# Patient Record
Sex: FEMALE | Race: OTHER | NOT HISPANIC OR LATINO | Employment: OTHER | URBAN - METROPOLITAN AREA
[De-identification: names, ages, dates, MRNs, and addresses within clinical notes are randomized per-mention and may not be internally consistent; named-entity substitution may affect disease eponyms.]

---

## 2024-09-05 ENCOUNTER — HOSPITAL ENCOUNTER (EMERGENCY)
Facility: HOSPITAL | Age: 79
Discharge: HOME | End: 2024-09-06
Attending: EMERGENCY MEDICINE
Payer: COMMERCIAL

## 2024-09-05 ENCOUNTER — APPOINTMENT (OUTPATIENT)
Dept: RADIOLOGY | Facility: HOSPITAL | Age: 79
End: 2024-09-05
Payer: COMMERCIAL

## 2024-09-05 DIAGNOSIS — N17.9 AKI (ACUTE KIDNEY INJURY) (CMS-HCC): ICD-10-CM

## 2024-09-05 DIAGNOSIS — N30.00 ACUTE CYSTITIS WITHOUT HEMATURIA: Primary | ICD-10-CM

## 2024-09-05 LAB
ALBUMIN SERPL BCP-MCNC: 3.5 G/DL (ref 3.4–5)
ALP SERPL-CCNC: 67 U/L (ref 33–136)
ALT SERPL W P-5'-P-CCNC: 16 U/L (ref 7–45)
ANION GAP SERPL CALC-SCNC: 12 MMOL/L (ref 10–20)
APPEARANCE UR: CLEAR
AST SERPL W P-5'-P-CCNC: 16 U/L (ref 9–39)
BASOPHILS # BLD AUTO: 0.08 X10*3/UL (ref 0–0.1)
BASOPHILS NFR BLD AUTO: 1 %
BILIRUB SERPL-MCNC: 0.3 MG/DL (ref 0–1.2)
BILIRUB UR STRIP.AUTO-MCNC: NEGATIVE MG/DL
BUN SERPL-MCNC: 20 MG/DL (ref 6–23)
CALCIUM SERPL-MCNC: 8.6 MG/DL (ref 8.6–10.3)
CHLORIDE SERPL-SCNC: 101 MMOL/L (ref 98–107)
CO2 SERPL-SCNC: 26 MMOL/L (ref 21–32)
COLOR UR: COLORLESS
CREAT SERPL-MCNC: 1.6 MG/DL (ref 0.5–1.05)
EGFRCR SERPLBLD CKD-EPI 2021: 33 ML/MIN/1.73M*2
EOSINOPHIL # BLD AUTO: 0.28 X10*3/UL (ref 0–0.4)
EOSINOPHIL NFR BLD AUTO: 3.4 %
ERYTHROCYTE [DISTWIDTH] IN BLOOD BY AUTOMATED COUNT: 13.7 % (ref 11.5–14.5)
GLUCOSE SERPL-MCNC: 107 MG/DL (ref 74–99)
GLUCOSE UR STRIP.AUTO-MCNC: NORMAL MG/DL
HCT VFR BLD AUTO: 31.2 % (ref 36–46)
HGB BLD-MCNC: 10.1 G/DL (ref 12–16)
IMM GRANULOCYTES # BLD AUTO: 0.04 X10*3/UL (ref 0–0.5)
IMM GRANULOCYTES NFR BLD AUTO: 0.5 % (ref 0–0.9)
KETONES UR STRIP.AUTO-MCNC: NEGATIVE MG/DL
LACTATE SERPL-SCNC: 0.6 MMOL/L (ref 0.4–2)
LEUKOCYTE ESTERASE UR QL STRIP.AUTO: ABNORMAL
LYMPHOCYTES # BLD AUTO: 1.42 X10*3/UL (ref 0.8–3)
LYMPHOCYTES NFR BLD AUTO: 17.2 %
MCH RBC QN AUTO: 29.4 PG (ref 26–34)
MCHC RBC AUTO-ENTMCNC: 32.4 G/DL (ref 32–36)
MCV RBC AUTO: 91 FL (ref 80–100)
MONOCYTES # BLD AUTO: 0.89 X10*3/UL (ref 0.05–0.8)
MONOCYTES NFR BLD AUTO: 10.8 %
NEUTROPHILS # BLD AUTO: 5.55 X10*3/UL (ref 1.6–5.5)
NEUTROPHILS NFR BLD AUTO: 67.1 %
NITRITE UR QL STRIP.AUTO: NEGATIVE
NRBC BLD-RTO: 0 /100 WBCS (ref 0–0)
PH UR STRIP.AUTO: 7 [PH]
PLATELET # BLD AUTO: 231 X10*3/UL (ref 150–450)
POTASSIUM SERPL-SCNC: 4.1 MMOL/L (ref 3.5–5.3)
PROT SERPL-MCNC: 6.5 G/DL (ref 6.4–8.2)
PROT UR STRIP.AUTO-MCNC: NEGATIVE MG/DL
RBC # BLD AUTO: 3.44 X10*6/UL (ref 4–5.2)
RBC # UR STRIP.AUTO: NEGATIVE /UL
RBC #/AREA URNS AUTO: ABNORMAL /HPF
SODIUM SERPL-SCNC: 135 MMOL/L (ref 136–145)
SP GR UR STRIP.AUTO: 1.01
SQUAMOUS #/AREA URNS AUTO: ABNORMAL /HPF
UROBILINOGEN UR STRIP.AUTO-MCNC: NORMAL MG/DL
WBC # BLD AUTO: 8.3 X10*3/UL (ref 4.4–11.3)
WBC #/AREA URNS AUTO: ABNORMAL /HPF

## 2024-09-05 PROCEDURE — 74177 CT ABD & PELVIS W/CONTRAST: CPT

## 2024-09-05 PROCEDURE — 96375 TX/PRO/DX INJ NEW DRUG ADDON: CPT

## 2024-09-05 PROCEDURE — 96361 HYDRATE IV INFUSION ADD-ON: CPT

## 2024-09-05 PROCEDURE — 36415 COLL VENOUS BLD VENIPUNCTURE: CPT | Performed by: EMERGENCY MEDICINE

## 2024-09-05 PROCEDURE — 87086 URINE CULTURE/COLONY COUNT: CPT | Mod: PARLAB | Performed by: EMERGENCY MEDICINE

## 2024-09-05 PROCEDURE — 81001 URINALYSIS AUTO W/SCOPE: CPT | Performed by: EMERGENCY MEDICINE

## 2024-09-05 PROCEDURE — 74177 CT ABD & PELVIS W/CONTRAST: CPT | Performed by: RADIOLOGY

## 2024-09-05 PROCEDURE — 80053 COMPREHEN METABOLIC PANEL: CPT | Performed by: EMERGENCY MEDICINE

## 2024-09-05 PROCEDURE — 85025 COMPLETE CBC W/AUTO DIFF WBC: CPT | Performed by: EMERGENCY MEDICINE

## 2024-09-05 PROCEDURE — 99284 EMERGENCY DEPT VISIT MOD MDM: CPT | Mod: 25

## 2024-09-05 PROCEDURE — 2550000001 HC RX 255 CONTRASTS: Performed by: EMERGENCY MEDICINE

## 2024-09-05 PROCEDURE — 2500000004 HC RX 250 GENERAL PHARMACY W/ HCPCS (ALT 636 FOR OP/ED): Performed by: EMERGENCY MEDICINE

## 2024-09-05 PROCEDURE — 83605 ASSAY OF LACTIC ACID: CPT | Performed by: EMERGENCY MEDICINE

## 2024-09-05 RX ORDER — MORPHINE SULFATE 4 MG/ML
4 INJECTION, SOLUTION INTRAMUSCULAR; INTRAVENOUS ONCE
Status: DISCONTINUED | OUTPATIENT
Start: 2024-09-05 | End: 2024-09-06 | Stop reason: HOSPADM

## 2024-09-05 RX ORDER — KETOROLAC TROMETHAMINE 30 MG/ML
15 INJECTION, SOLUTION INTRAMUSCULAR; INTRAVENOUS ONCE
Status: COMPLETED | OUTPATIENT
Start: 2024-09-05 | End: 2024-09-05

## 2024-09-05 RX ORDER — ONDANSETRON HYDROCHLORIDE 2 MG/ML
4 INJECTION, SOLUTION INTRAVENOUS ONCE
Status: COMPLETED | OUTPATIENT
Start: 2024-09-05 | End: 2024-09-05

## 2024-09-05 ASSESSMENT — PAIN DESCRIPTION - ORIENTATION
ORIENTATION: LEFT
ORIENTATION: LEFT;LOWER

## 2024-09-05 ASSESSMENT — LIFESTYLE VARIABLES
TOTAL SCORE: 0
EVER FELT BAD OR GUILTY ABOUT YOUR DRINKING: NO
HAVE PEOPLE ANNOYED YOU BY CRITICIZING YOUR DRINKING: NO
HAVE YOU EVER FELT YOU SHOULD CUT DOWN ON YOUR DRINKING: NO
EVER HAD A DRINK FIRST THING IN THE MORNING TO STEADY YOUR NERVES TO GET RID OF A HANGOVER: NO

## 2024-09-05 ASSESSMENT — PAIN DESCRIPTION - FREQUENCY: FREQUENCY: INTERMITTENT

## 2024-09-05 ASSESSMENT — PAIN DESCRIPTION - LOCATION
LOCATION: GROIN
LOCATION: GROIN

## 2024-09-05 ASSESSMENT — PAIN - FUNCTIONAL ASSESSMENT: PAIN_FUNCTIONAL_ASSESSMENT: 0-10

## 2024-09-05 ASSESSMENT — PAIN DESCRIPTION - PAIN TYPE: TYPE: ACUTE PAIN

## 2024-09-05 ASSESSMENT — COLUMBIA-SUICIDE SEVERITY RATING SCALE - C-SSRS
2. HAVE YOU ACTUALLY HAD ANY THOUGHTS OF KILLING YOURSELF?: NO
6. HAVE YOU EVER DONE ANYTHING, STARTED TO DO ANYTHING, OR PREPARED TO DO ANYTHING TO END YOUR LIFE?: NO
1. IN THE PAST MONTH, HAVE YOU WISHED YOU WERE DEAD OR WISHED YOU COULD GO TO SLEEP AND NOT WAKE UP?: NO

## 2024-09-05 ASSESSMENT — PAIN SCALES - GENERAL
PAINLEVEL_OUTOF10: 10 - WORST POSSIBLE PAIN
PAINLEVEL_OUTOF10: 10 - WORST POSSIBLE PAIN

## 2024-09-06 VITALS
WEIGHT: 114 LBS | BODY MASS INDEX: 22.98 KG/M2 | HEART RATE: 86 BPM | DIASTOLIC BLOOD PRESSURE: 61 MMHG | HEIGHT: 59 IN | SYSTOLIC BLOOD PRESSURE: 173 MMHG | OXYGEN SATURATION: 100 % | TEMPERATURE: 97 F | RESPIRATION RATE: 15 BRPM

## 2024-09-06 LAB — HOLD SPECIMEN: NORMAL

## 2024-09-06 PROCEDURE — 96365 THER/PROPH/DIAG IV INF INIT: CPT

## 2024-09-06 PROCEDURE — 2500000004 HC RX 250 GENERAL PHARMACY W/ HCPCS (ALT 636 FOR OP/ED): Performed by: EMERGENCY MEDICINE

## 2024-09-06 RX ORDER — CEPHALEXIN 500 MG/1
500 CAPSULE ORAL 2 TIMES DAILY
Qty: 20 CAPSULE | Refills: 0 | Status: SHIPPED | OUTPATIENT
Start: 2024-09-06 | End: 2024-09-16

## 2024-09-06 RX ORDER — CEFTRIAXONE 1 G/50ML
1 INJECTION, SOLUTION INTRAVENOUS ONCE
Status: COMPLETED | OUTPATIENT
Start: 2024-09-06 | End: 2024-09-06

## 2024-09-06 ASSESSMENT — PAIN SCALES - GENERAL
PAINLEVEL_OUTOF10: 0 - NO PAIN
PAINLEVEL_OUTOF10: 0 - NO PAIN

## 2024-09-06 NOTE — ED PROVIDER NOTES
HPI   Chief Complaint   Patient presents with    Groin Pain       78-year-old female hypertension presents with left lower quadrant pain.  Patient reports severe pain lower in her left hemiabdomen that started this afternoon.  No fever, cough, nausea, vomiting diarrhea or constipation.  No dysuria, hematuria urinary frequency, vaginal discharge or vaginal bleeding.  Patient denies any prior similar history.  No history of any abdominal surgeries.  She tried aspirin at home without significant relief and came to the ED with a ice pack tucked into her waistband with mild relief.      History provided by:  Spouse   used: No            Patient History   No past medical history on file.  No past surgical history on file.  No family history on file.  Social History     Tobacco Use    Smoking status: Not on file    Smokeless tobacco: Not on file   Substance Use Topics    Alcohol use: Not on file    Drug use: Not on file       Physical Exam   ED Triage Vitals [09/05/24 2043]   Temperature Heart Rate Respirations BP   36.1 °C (97 °F) 77 16 (!) 210/90      Pulse Ox Temp Source Heart Rate Source Patient Position   97 % Temporal Monitor Sitting      BP Location FiO2 (%)     Right arm --       Physical Exam  Vitals and nursing note reviewed.   Constitutional:       General: She is in acute distress.      Appearance: She is normal weight. She is not ill-appearing, toxic-appearing or diaphoretic.   HENT:      Head: Normocephalic and atraumatic.      Nose: Nose normal. No congestion or rhinorrhea.      Mouth/Throat:      Mouth: Mucous membranes are moist.      Pharynx: Oropharynx is clear.   Eyes:      General: No scleral icterus.        Right eye: No discharge.         Left eye: No discharge.      Extraocular Movements: Extraocular movements intact.      Conjunctiva/sclera: Conjunctivae normal.      Pupils: Pupils are equal, round, and reactive to light.   Cardiovascular:      Rate and Rhythm: Normal rate and  regular rhythm.      Pulses: Normal pulses.      Heart sounds: No murmur heard.     No friction rub. No gallop.   Pulmonary:      Effort: Pulmonary effort is normal. No respiratory distress.      Breath sounds: Normal breath sounds. No stridor. No wheezing, rhonchi or rales.   Abdominal:      General: There is no distension.      Palpations: Abdomen is soft.      Tenderness: There is abdominal tenderness. There is guarding. There is no rebound.   Musculoskeletal:         General: No swelling, tenderness, deformity or signs of injury. Normal range of motion.      Cervical back: Normal range of motion and neck supple. No rigidity or tenderness.      Right lower leg: No edema.      Left lower leg: No edema.   Skin:     General: Skin is warm and dry.      Coloration: Skin is not jaundiced or pale.      Findings: No bruising, erythema, lesion or rash.   Neurological:      General: No focal deficit present.      Mental Status: She is alert and oriented to person, place, and time.   Psychiatric:         Mood and Affect: Mood normal.         Behavior: Behavior normal.           ED Course & MDM   Diagnoses as of 09/10/24 0017   Acute cystitis without hematuria   XENA (acute kidney injury) (CMS-[facility])                 No data recorded     Shannon Coma Scale Score: 15 (09/05/24 2205 : Shalini Fletcher RN)                           Medical Decision Making  78-year-old female presents with left lower quadrant abdominal pain.  Patient hypertensive with significant tenderness but abdomen soft on exam.  Labs notable for worsening anemia compared to prior (from 4yr ago, no recent prior available to me on review of EMR) and patient denies any overt bleeding.  Labs also show XENA (no recent prior available) without acidosis or hyperkalemia, but grossly normal electrolytes and UA consistent with UTI.  CT without acute pathology though with more pronounced lung fibrotic change/reticular nodules compared to prior (recommend outpatient follow  up).  She is feeling better with pain meds and missed her home BP meds, which explains her hypertension (also due to pain, which was treated and BP improved).  Discussed with patient and family results, including worsening of anemia, XENA and UTI and offered/recommended admission for IV antibiotics and close monitoring.  As patient is feeling much better, she wants to go home and agrees to follow-up with outpatient instead.  Recommend follow-up for management and monitoring of her anemia, renal function, UTI and CT findings.  Will DC with prescription for p.o. antibiotics.    Amount and/or Complexity of Data Reviewed  Independent Historian: spouse  External Data Reviewed: labs.  Labs: ordered.  Radiology: ordered.    Risk  Prescription drug management.  Decision regarding hospitalization.        Procedure  Procedures     Elyse H Klerman, MD  09/10/24 0021       Elyse H Klerman, MD  09/10/24 0037       Elyse H Klerman, MD  09/10/24 0044

## 2024-09-06 NOTE — ED TRIAGE NOTES
79 y/o female complains of several days of left groin pain. Denies urinary symptoms at time of triage.

## 2024-09-06 NOTE — DISCHARGE INSTRUCTIONS
You were seen in the ED for left pelvic pain and found to have a UTI.  Bloodwork also shows a decline in your kidney functions, possibly related to the infection.  Please drink plenty of fluids and follow up closely with your PCP (preferably in 3-5 days) for close monitoring.  If your kidney function does not improve, you may need to be admitted to the hospital for further care or see a kidney specialist urgently.

## 2024-09-07 LAB — BACTERIA UR CULT: NORMAL

## 2024-09-20 ENCOUNTER — NURSING HOME VISIT (OUTPATIENT)
Dept: POST ACUTE CARE | Facility: EXTERNAL LOCATION | Age: 79
End: 2024-09-20
Payer: COMMERCIAL

## 2024-09-20 DIAGNOSIS — I10 HYPERTENSION, UNSPECIFIED TYPE: ICD-10-CM

## 2024-09-20 DIAGNOSIS — M54.40 CHRONIC LOW BACK PAIN WITH SCIATICA, SCIATICA LATERALITY UNSPECIFIED, UNSPECIFIED BACK PAIN LATERALITY: Primary | ICD-10-CM

## 2024-09-20 DIAGNOSIS — R53.1 GENERAL WEAKNESS: ICD-10-CM

## 2024-09-20 DIAGNOSIS — E78.5 HYPERLIPIDEMIA, UNSPECIFIED HYPERLIPIDEMIA TYPE: ICD-10-CM

## 2024-09-20 DIAGNOSIS — G89.29 CHRONIC LOW BACK PAIN WITH SCIATICA, SCIATICA LATERALITY UNSPECIFIED, UNSPECIFIED BACK PAIN LATERALITY: Primary | ICD-10-CM

## 2024-09-20 PROCEDURE — 99304 1ST NF CARE SF/LOW MDM 25: CPT | Performed by: STUDENT IN AN ORGANIZED HEALTH CARE EDUCATION/TRAINING PROGRAM

## 2025-03-10 ENCOUNTER — APPOINTMENT (OUTPATIENT)
Dept: CARDIOLOGY | Facility: HOSPITAL | Age: 80
End: 2025-03-10
Payer: COMMERCIAL

## 2025-03-10 ENCOUNTER — HOSPITAL ENCOUNTER (EMERGENCY)
Facility: HOSPITAL | Age: 80
Discharge: HOME | End: 2025-03-10
Payer: COMMERCIAL

## 2025-03-10 VITALS
WEIGHT: 157 LBS | SYSTOLIC BLOOD PRESSURE: 161 MMHG | BODY MASS INDEX: 23.79 KG/M2 | HEIGHT: 68 IN | TEMPERATURE: 98.8 F | DIASTOLIC BLOOD PRESSURE: 70 MMHG | HEART RATE: 80 BPM | RESPIRATION RATE: 17 BRPM | OXYGEN SATURATION: 88 %

## 2025-03-10 DIAGNOSIS — R62.7 FAILURE TO THRIVE IN ADULT: ICD-10-CM

## 2025-03-10 DIAGNOSIS — E86.0 DEHYDRATION: Primary | ICD-10-CM

## 2025-03-10 LAB
ALBUMIN SERPL BCP-MCNC: 3.6 G/DL (ref 3.4–5)
ALP SERPL-CCNC: 45 U/L (ref 33–136)
ALT SERPL W P-5'-P-CCNC: 9 U/L (ref 7–45)
ANION GAP SERPL CALC-SCNC: 14 MMOL/L (ref 10–20)
APPEARANCE UR: CLEAR
AST SERPL W P-5'-P-CCNC: 11 U/L (ref 9–39)
BASOPHILS # BLD AUTO: 0.05 X10*3/UL (ref 0–0.1)
BASOPHILS NFR BLD AUTO: 0.6 %
BILIRUB SERPL-MCNC: 0.4 MG/DL (ref 0–1.2)
BILIRUB UR STRIP.AUTO-MCNC: NEGATIVE MG/DL
BUN SERPL-MCNC: 39 MG/DL (ref 6–23)
CALCIUM SERPL-MCNC: 8.5 MG/DL (ref 8.6–10.3)
CARDIAC TROPONIN I PNL SERPL HS: 7 NG/L (ref 0–13)
CHLORIDE SERPL-SCNC: 108 MMOL/L (ref 98–107)
CO2 SERPL-SCNC: 21 MMOL/L (ref 21–32)
COLOR UR: ABNORMAL
CREAT SERPL-MCNC: 1.41 MG/DL (ref 0.5–1.05)
EGFRCR SERPLBLD CKD-EPI 2021: 38 ML/MIN/1.73M*2
EOSINOPHIL # BLD AUTO: 0.3 X10*3/UL (ref 0–0.4)
EOSINOPHIL NFR BLD AUTO: 3.8 %
ERYTHROCYTE [DISTWIDTH] IN BLOOD BY AUTOMATED COUNT: 13 % (ref 11.5–14.5)
FLUAV RNA RESP QL NAA+PROBE: NOT DETECTED
FLUBV RNA RESP QL NAA+PROBE: NOT DETECTED
GLUCOSE BLD MANUAL STRIP-MCNC: 98 MG/DL (ref 74–99)
GLUCOSE SERPL-MCNC: 86 MG/DL (ref 74–99)
GLUCOSE UR STRIP.AUTO-MCNC: NORMAL MG/DL
HCT VFR BLD AUTO: 27.3 % (ref 36–46)
HGB BLD-MCNC: 8.6 G/DL (ref 12–16)
IMM GRANULOCYTES # BLD AUTO: 0.13 X10*3/UL (ref 0–0.5)
IMM GRANULOCYTES NFR BLD AUTO: 1.6 % (ref 0–0.9)
KETONES UR STRIP.AUTO-MCNC: NEGATIVE MG/DL
LEUKOCYTE ESTERASE UR QL STRIP.AUTO: ABNORMAL
LIPASE SERPL-CCNC: 59 U/L (ref 9–82)
LYMPHOCYTES # BLD AUTO: 1.59 X10*3/UL (ref 0.8–3)
LYMPHOCYTES NFR BLD AUTO: 19.9 %
MAGNESIUM SERPL-MCNC: 1.86 MG/DL (ref 1.6–2.4)
MCH RBC QN AUTO: 29.8 PG (ref 26–34)
MCHC RBC AUTO-ENTMCNC: 31.5 G/DL (ref 32–36)
MCV RBC AUTO: 95 FL (ref 80–100)
MONOCYTES # BLD AUTO: 0.8 X10*3/UL (ref 0.05–0.8)
MONOCYTES NFR BLD AUTO: 10 %
NEUTROPHILS # BLD AUTO: 5.12 X10*3/UL (ref 1.6–5.5)
NEUTROPHILS NFR BLD AUTO: 64.1 %
NITRITE UR QL STRIP.AUTO: NEGATIVE
NRBC BLD-RTO: 0 /100 WBCS (ref 0–0)
PH UR STRIP.AUTO: 5 [PH]
PLATELET # BLD AUTO: 397 X10*3/UL (ref 150–450)
POTASSIUM SERPL-SCNC: 3.9 MMOL/L (ref 3.5–5.3)
PROT SERPL-MCNC: 6.1 G/DL (ref 6.4–8.2)
PROT UR STRIP.AUTO-MCNC: NEGATIVE MG/DL
RBC # BLD AUTO: 2.89 X10*6/UL (ref 4–5.2)
RBC # UR STRIP.AUTO: NEGATIVE MG/DL
RBC #/AREA URNS AUTO: NORMAL /HPF
SARS-COV-2 RNA RESP QL NAA+PROBE: NOT DETECTED
SODIUM SERPL-SCNC: 139 MMOL/L (ref 136–145)
SP GR UR STRIP.AUTO: 1.02
URATE CRY #/AREA UR COMP ASSIST: NORMAL /HPF
UROBILINOGEN UR STRIP.AUTO-MCNC: NORMAL MG/DL
WBC # BLD AUTO: 8 X10*3/UL (ref 4.4–11.3)
WBC #/AREA URNS AUTO: NORMAL /HPF

## 2025-03-10 PROCEDURE — 84484 ASSAY OF TROPONIN QUANT: CPT | Performed by: NURSE PRACTITIONER

## 2025-03-10 PROCEDURE — 80053 COMPREHEN METABOLIC PANEL: CPT | Performed by: NURSE PRACTITIONER

## 2025-03-10 PROCEDURE — 2500000004 HC RX 250 GENERAL PHARMACY W/ HCPCS (ALT 636 FOR OP/ED): Performed by: NURSE PRACTITIONER

## 2025-03-10 PROCEDURE — 87086 URINE CULTURE/COLONY COUNT: CPT | Mod: PARLAB | Performed by: NURSE PRACTITIONER

## 2025-03-10 PROCEDURE — 93005 ELECTROCARDIOGRAM TRACING: CPT

## 2025-03-10 PROCEDURE — 83690 ASSAY OF LIPASE: CPT | Performed by: NURSE PRACTITIONER

## 2025-03-10 PROCEDURE — 99284 EMERGENCY DEPT VISIT MOD MDM: CPT | Mod: 25

## 2025-03-10 PROCEDURE — 85025 COMPLETE CBC W/AUTO DIFF WBC: CPT | Performed by: NURSE PRACTITIONER

## 2025-03-10 PROCEDURE — 36415 COLL VENOUS BLD VENIPUNCTURE: CPT | Performed by: NURSE PRACTITIONER

## 2025-03-10 PROCEDURE — 81001 URINALYSIS AUTO W/SCOPE: CPT | Performed by: NURSE PRACTITIONER

## 2025-03-10 PROCEDURE — 96360 HYDRATION IV INFUSION INIT: CPT

## 2025-03-10 PROCEDURE — 83735 ASSAY OF MAGNESIUM: CPT | Performed by: NURSE PRACTITIONER

## 2025-03-10 PROCEDURE — 82947 ASSAY GLUCOSE BLOOD QUANT: CPT | Mod: 59

## 2025-03-10 PROCEDURE — 87636 SARSCOV2 & INF A&B AMP PRB: CPT | Performed by: NURSE PRACTITIONER

## 2025-03-10 RX ORDER — FAMOTIDINE 20 MG/1
20 TABLET, FILM COATED ORAL 2 TIMES DAILY
Qty: 30 TABLET | Refills: 0 | Status: SHIPPED | OUTPATIENT
Start: 2025-03-10 | End: 2025-03-20 | Stop reason: HOSPADM

## 2025-03-10 RX ADMIN — SODIUM CHLORIDE 1000 ML: 9 INJECTION, SOLUTION INTRAVENOUS at 07:51

## 2025-03-10 ASSESSMENT — COLUMBIA-SUICIDE SEVERITY RATING SCALE - C-SSRS
1. IN THE PAST MONTH, HAVE YOU WISHED YOU WERE DEAD OR WISHED YOU COULD GO TO SLEEP AND NOT WAKE UP?: NO
2. HAVE YOU ACTUALLY HAD ANY THOUGHTS OF KILLING YOURSELF?: NO
6. HAVE YOU EVER DONE ANYTHING, STARTED TO DO ANYTHING, OR PREPARED TO DO ANYTHING TO END YOUR LIFE?: NO

## 2025-03-10 ASSESSMENT — LIFESTYLE VARIABLES
EVER HAD A DRINK FIRST THING IN THE MORNING TO STEADY YOUR NERVES TO GET RID OF A HANGOVER: NO
EVER FELT BAD OR GUILTY ABOUT YOUR DRINKING: NO
TOTAL SCORE: 0
HAVE PEOPLE ANNOYED YOU BY CRITICIZING YOUR DRINKING: NO
HAVE YOU EVER FELT YOU SHOULD CUT DOWN ON YOUR DRINKING: NO

## 2025-03-10 ASSESSMENT — PAIN - FUNCTIONAL ASSESSMENT: PAIN_FUNCTIONAL_ASSESSMENT: 0-10

## 2025-03-10 ASSESSMENT — PAIN SCALES - GENERAL: PAINLEVEL_OUTOF10: 4

## 2025-03-10 NOTE — ED NOTES
PT VERBALIZES UNDERSTANDING OF DX, PRESCRIPTION INFORMATION AND IMPORTANCE OF FOLLOW UP. ALL PT QUESTIONS ANSWERED. VITALS UPDATED AND STABLE. IV REMOVED.     Lima Santo, JORGE  03/10/25 4274

## 2025-03-10 NOTE — ED PROVIDER NOTES
EMERGENCY DEPARTMENT ENCOUNTER      Pt Name: Coby Ferris  MRN: [id number]  Birthdate 1945  Date of evaluation: 3/10/2025  Provider: ERIC Malagon-CNP    CHIEF COMPLAINT       Chief Complaint   Patient presents with    Dehydration     Brought in by Daryn PERALTA with complaints of dehydration from , he also states that she has not been eating and drinking.        HISTORY OF PRESENT ILLNESS    Coby Ferris is a 79 y.o. female who presents to the emergency department via EMS for evaluation of failure to thrive.  Per EMS they stated that they were called by the patient's  who states that the patient has not been eating or drinking for the last couple days and is concerned that she is dehydrated.  Patient states that she cannot remember why she did not eat or drink but states that she does forget.  She denies any complaints at this time.    Nursing Notes were reviewed.    History provided by patient/EMS.  No  used.    REVIEW OF SYSTEMS     ROS is otherwise negative limited to the patient's dementia    PAST MEDICAL HISTORY   No past medical history on file.    SURGICAL HISTORY     No past surgical history on file.    ALLERGIES     Codeine    FAMILY HISTORY     No family history on file.     SOCIAL HISTORY       Social History     Socioeconomic History    Marital status:      Social Drivers of Health     Financial Resource Strain: Medium Risk (2/20/2025)    Received from [facility]    Overall Financial Resource Strain (CARDIA)     Difficulty of Paying Living Expenses: Somewhat hard   Food Insecurity: Food Insecurity Present (2/20/2025)    Received from [facility]    Hunger Vital Sign     Worried About Running Out of Food in the Last Year: Sometimes true     Ran Out of Food in the Last Year: Sometimes true   Transportation Needs: No Transportation Needs (2/20/2025)    Received from [facility]    PRAPARE - Transportation     Lack of Transportation (Medical):  No     Lack of Transportation (Non-Medical): No   Physical Activity: Sufficiently Active (2/20/2025)    Received from [facility]    Exercise Vital Sign     Days of Exercise per Week: 7 days     Minutes of Exercise per Session: 30 min   Stress: No Stress Concern Present (2/20/2025)    Received from [facility]    [employer] [place] [employer] - Occupational Stress Questionnaire     Feeling of Stress : Not at all   Social Connections: Socially Isolated (2/20/2025)    Received from [facility]    Social Connection and Isolation Panel [NHANES]     Frequency of Communication with Friends and Family: Once a week     Frequency of Social Gatherings with Friends and Family: Once a week     Attends [employer] Services: Never     Active Member of Clubs or Organizations: No     Attends Club or Organization Meetings: Never     Marital Status:    Housing Stability: Unknown (2/20/2025)    Received from [facility]    Housing Stability Vital Sign     Unable to Pay for Housing in the Last Year: No     Homeless in the Last Year: No       PHYSICAL EXAM   VS: As documented in the triage note and EMR flowsheet from this visit were reviewed.    GEN: NAD, nontoxic, chronically ill, elderly, resting comfortably in ED cart without difficulty or dyspnea  EYES: PERRLA  HEENT: Airway patent  CARD: RRR, nontender chest, no crepitus deformities, no JVD, no murmurs rubs or gallops ; No edema noted.  Positive pulses bilaterally throughout.  Capillary refill less than 3 seconds.  No abnormal redness, warmth, tenderness or swelling noted to bilateral lower extremities.  PULMONARY: Clear all lung fields. Moving air well, Nonlabored, no accessory muscle use, able to speak complete sentences  ABDOMEN: Abdomen soft, non-distended, no rebound, no guarding. Bowel sounds normal in all 4 quadrants. No tenderness to palpation.  No CVA tenderness.  No masses or organomegaly noted.  No evidence of peritonitis.   :  deferred  MUSK: Spine appears normal, range of motion is not limited, no muscle or joint tenderness.  Generalized weakness noted no step-offs, deformities or additional signs of trauma noted.  No spinal/midline tenderness to palpation  SKIN: Skin normal color for race, warm, dry and intact. No evidence of trauma. No rash noted.  NEURO: Alert and oriented x 2, speech is clear, no obvious deficits noted. No facial droop noted.  GCS 14  PSYCH: Alert and oriented to person and place. normal mood and affect. No apparent risk to self or others. Thoughts are linear.  Does not appear decompensated.  Does not appear internally stimulated.  LYMPH: No adenopathy or splenomegaly. No cervical, supraclavicular or inguinal lymphadenopathy.    DIAGNOSTIC RESULTS   RADIOLOGY:   Non-plain film images such as CT, Ultrasound and MRI are read by the radiologist. Plain radiographic images are visualized and preliminarily interpreted by myself with the below findings: None      Interpretation per the Radiologist below, if available at the time of this note:    No orders to display         ED BEDSIDE ULTRASOUND:   Performed by myself - none    LABS:  Labs Reviewed   CBC WITH AUTO DIFFERENTIAL - Abnormal       Result Value    WBC 8.0      nRBC 0.0      RBC 2.89 (*)     Hemoglobin 8.6 (*)     Hematocrit 27.3 (*)     MCV 95      MCH 29.8      MCHC 31.5 (*)     RDW 13.0      Platelets 397      Neutrophils % 64.1      Immature Granulocytes %, Automated 1.6 (*)     Lymphocytes % 19.9      Monocytes % 10.0      Eosinophils % 3.8      Basophils % 0.6      Neutrophils Absolute 5.12      Immature Granulocytes Absolute, Automated 0.13      Lymphocytes Absolute 1.59      Monocytes Absolute 0.80      Eosinophils Absolute 0.30      Basophils Absolute 0.05     COMPREHENSIVE METABOLIC PANEL - Abnormal    Glucose 86      Sodium 139      Potassium 3.9      Chloride 108 (*)     Bicarbonate 21      Anion Gap 14      Urea Nitrogen 39 (*)     Creatinine 1.41  (*)     eGFR 38 (*)     Calcium 8.5 (*)     Albumin 3.6      Alkaline Phosphatase 45      Total Protein 6.1 (*)     AST 11      Bilirubin, Total 0.4      ALT 9     URINALYSIS WITH REFLEX CULTURE AND MICROSCOPIC - Abnormal    Color, Urine Light-Yellow      Appearance, Urine Clear      Specific Gravity, Urine 1.017      pH, Urine 5.0      Protein, Urine NEGATIVE      Glucose, Urine Normal      Blood, Urine NEGATIVE      Ketones, Urine NEGATIVE      Bilirubin, Urine NEGATIVE      Urobilinogen, Urine Normal      Nitrite, Urine NEGATIVE      Leukocyte Esterase, Urine 25 Preston/uL (*)    MAGNESIUM - Normal    Magnesium 1.86     LIPASE - Normal    Lipase 59      Narrative:     Venipuncture immediately after or during the administration of Metamizole may lead to falsely low results. Testing should be performed immediately prior to Metamizole dosing.   TROPONIN I, HIGH SENSITIVITY - Normal    Troponin I, High Sensitivity 7      Narrative:     Less than 99th percentile of normal range cutoff-  Female and children under 18 years old <14 ng/L; Male <21 ng/L: Negative  Repeat testing should be performed if clinically indicated.     Female and children under 18 years old 14-50 ng/L; Male 21-50 ng/L:  Consistent with possible cardiac damage and possible increased clinical   risk. Serial measurements may help to assess extent of myocardial damage.     >50 ng/L: Consistent with cardiac damage, increased clinical risk and  myocardial infarction. Serial measurements may help assess extent of   myocardial damage.      NOTE: Children less than 1 year old may have higher baseline troponin   levels and results should be interpreted in conjunction with the overall   clinical context.     NOTE: Troponin I testing is performed using a different   testing methodology at [facility] than at other   [facility]. Direct result comparisons should only   be made within the same method.   INFLUENZA A AND B PCR - Normal    Flu A  Result Not Detected      Flu B Result Not Detected      Narrative:     This assay is an in vitro diagnostic multiplex nucleic acid amplification test for the detection and discrimination of Influenza A & B from nasopharyngeal specimens, and has been validated for use at [facility]. Negative results do not preclude Influenza A/B infections, and should not be used as the sole basis for diagnosis, treatment, or other management decisions. If Influenza A/B and RSV PCR results are negative, testing for Parainfluenza virus, Adenovirus and Metapneumovirus is routinely performed for [facility] pediatric oncology and intensive care inpatients, and is available on other patients by placing an add-on request.   SARS-COV-2 PCR - Normal    Coronavirus 2019, PCR Not Detected      Narrative:     This assay is an FDA-cleared, in vitro diagnostic nucleic acid amplification test for the qualitative detection and differentiation of SARS CoV-2 from nasopharyngeal specimens collected from individuals with signs and symptoms of respiratory tract infections, and has been validated for use at [facility]. Negative results do not preclude COVID-19 infections and should not be used as the sole basis for diagnosis, treatment, or other management decisions. Testing for SARS CoV-2 is recommended only for patients who meet current clinical and/or epidemiological criteria defined by federal, state, or local public health directives.   MICROSCOPIC ONLY, URINE - Normal    WBC, Urine NONE      RBC, Urine NONE      Uric Acid Crystals, Urine 1+     POCT GLUCOSE - Normal    POCT Glucose 98     URINE CULTURE   URINALYSIS WITH REFLEX CULTURE AND MICROSCOPIC    Narrative:     The following orders were created for panel order Urinalysis with Reflex Culture and Microscopic.  Procedure                               Abnormality         Status                     ---------                               -----------          ------                     Urinalysis with Reflex C...[[id number]]  Abnormal            Final result               Extra Urine Gray Tube[[id number]]                                                         Please view results for these tests on the individual orders.   EXTRA URINE GRAY TUBE   POCT GLUCOSE METER       All other labs were within normal range or not returned as of this dictation.    EMERGENCY DEPARTMENT COURSE/MDM:   Vitals:    Vitals:    03/10/25 1200 03/10/25 1300 03/10/25 1400 03/10/25 1500   BP: 178/74 (!) 195/77 174/75 161/70   BP Location:       Patient Position:       Pulse: 81 84 77 80   Resp: 18 16 (!) 22 17   Temp:       TempSrc:       SpO2: 96% 96% 98% (!) 88%   Weight:       Height:           I reviewed the patient's triage vitals.    This is a 79-year-old female presents ED for evaluation of failure to thrive symptoms.  She is resting company in the ED cart without difficulty or dyspnea.  She is at her neurological baseline, calm and cooperative.  She does not appear overtly dehydrated and has a benign assessment.  She has no complaints.  She declined need for medication at this time but will give her IV fluids for hydration based off of story that she has not eaten or drink in the last couple days.  Diagnoses as of 03/10/25 1649   Dehydration   Failure to thrive in adult     Initial workup was reviewed and unremarkable/reassuring around baseline.  She does have a noted drop in her hemoglobin from 10.1-8.6 but no active evidence of bleeding - was seen at [place] recently and noted to be 7.9.   She has no abdominal pain or signs or symptoms of bleeding - she and family deny this also. Leukocytes on UA but no urinary symptoms and at neuro baseline, no indicated.     Her caregiver whom she lives with did present to bedside and I had discussion with him regarding living situation and if he is comfortable taking her home and monitoring her versus nursing home placement and he states that he  wants to call her son to make this determination.    I had a discussion with family members who the patient lives with and they stated that they feel comfortable taking the patient home.  They stated that she is at her baseline per her dementia. They do not feel that she would do well in a nursing home.  They stated that she did qualify for 24-hour nursing care and they will get this set up which I encouraged them to do so - educated they can bring her back for nursing home placement if they change their mind but at this time she has a large home support system.  She is tolerating p.o. prior to discharge and remained hemodynamic stable.  I was personally in the room and evaluated the patient for more than 15 minutes while discussion with family was held  with the patient she was never saturating at 88% on room air.  She was on oxygen while she was sleeping for comfort per her request, she does not need this or meet inpatient criteria for this.  They are educated if she wants oxygen at home that primary care or her home health agency can set this up for her.  Additionally states that she may not be tolerating p.o.'s well due to her reflux symptoms which she has a known history of and I prescribed her a course of Pepcid for management.  Educated to maintain proper rest and hydration.      Patient was counseled regarding labs, imaging, likely diagnosis, and plan. All questions were answered.     ------------------------------------------------------------------  EKG Interpretation: N/A    Differential Diagnoses Considered: Failure to thrive, dehydration, electrolyte abnormality, UTI, worsening dementia    Chronic Medical Conditions Significantly Affecting Care: None    External Records Reviewed: I reviewed recent and relevant outside records including: PCP notes, prior discharge summary, previous radiologic studies, HIE, [place] hospitalization    Escalation of Care:  Appropriate for outpatient management with primary  care provider follow-up in the next couple days for reevaluation, can additionally obtain GI consult if warranted for further anemia follow up.  Return precautions discussed and patient verbalized understanding. All questions and concerns were addressed.    Social Determinants of Health Significantly Affecting Care:  None    Prescription Drug Consideration: Pepcid p.o. for additional symptomatic management at home    Diagnostic testing considered: Considered imaging studies but she is at her neurological baseline, no respiratory distress and has no evidence of infection or indication for excessive radiation at this time    Discussion of Management with Other Providers:   I discussed the patient/results with: none      ------------------------------------------------------------------  ED Medications administered this visit:    Medications   sodium chloride 0.9 % bolus 1,000 mL (0 mL intravenous Stopped 3/10/25 0851)       New Prescriptions from this visit:    Discharge Medication List as of 3/10/2025  3:02 PM        START taking these medications    Details   famotidine (Pepcid) 20 mg tablet Take 1 tablet (20 mg) by mouth 2 times a day for 15 days., Starting Mon 3/10/2025, Until Tue 3/25/2025, Normal             Follow-up:  Mary Jo Figueredo DO  [address]  [address]  [place] [address] [phone]    Schedule an appointment as soon as possible for a visit in 3 days  reevaluation        Final Impression:   1. Dehydration    2. Failure to thrive in adult          ALFREDO Malagon        (Please note that portions of this note were completed with a voice recognition program.  Efforts were made to edit the dictations but occasionally words are mis-transcribed.)     ALFREDO Malagon  03/10/25 1649       ALFREDO Malagon  03/11/25 0143

## 2025-03-11 LAB — BACTERIA UR CULT: NORMAL

## 2025-03-14 LAB
ATRIAL RATE: 79 BPM
P AXIS: 19 DEGREES
P OFFSET: 213 MS
P ONSET: 177 MS
PR INTERVAL: 104 MS
Q ONSET: 229 MS
QRS COUNT: 13 BEATS
QRS DURATION: 72 MS
QT INTERVAL: 362 MS
QTC CALCULATION(BAZETT): 415 MS
QTC FREDERICIA: 396 MS
R AXIS: 43 DEGREES
T AXIS: 28 DEGREES
T OFFSET: 410 MS
VENTRICULAR RATE: 79 BPM

## 2025-03-18 ENCOUNTER — HOSPITAL ENCOUNTER (OUTPATIENT)
Facility: HOSPITAL | Age: 80
Setting detail: OBSERVATION
LOS: 1 days | Discharge: HOSPICE | End: 2025-03-20
Attending: STUDENT IN AN ORGANIZED HEALTH CARE EDUCATION/TRAINING PROGRAM | Admitting: INTERNAL MEDICINE
Payer: COMMERCIAL

## 2025-03-18 ENCOUNTER — APPOINTMENT (OUTPATIENT)
Dept: CARDIOLOGY | Facility: HOSPITAL | Age: 80
End: 2025-03-18
Payer: COMMERCIAL

## 2025-03-18 DIAGNOSIS — Z51.5 ENCOUNTER FOR ADMISSION TO HOSPICE CARE: ICD-10-CM

## 2025-03-18 DIAGNOSIS — Z51.5 HOSPICE CARE: ICD-10-CM

## 2025-03-18 DIAGNOSIS — F03.C0 SEVERE DEMENTIA, UNSPECIFIED DEMENTIA TYPE, UNSPECIFIED WHETHER BEHAVIORAL, PSYCHOTIC, OR MOOD DISTURBANCE OR ANXIETY: Primary | ICD-10-CM

## 2025-03-18 PROBLEM — R62.7 FAILURE TO THRIVE IN ADULT: Status: ACTIVE | Noted: 2025-03-18

## 2025-03-18 PROCEDURE — 93005 ELECTROCARDIOGRAM TRACING: CPT

## 2025-03-18 PROCEDURE — 1210000001 HC SEMI-PRIVATE ROOM DAILY

## 2025-03-18 PROCEDURE — 99285 EMERGENCY DEPT VISIT HI MDM: CPT | Performed by: STUDENT IN AN ORGANIZED HEALTH CARE EDUCATION/TRAINING PROGRAM

## 2025-03-18 PROCEDURE — 99223 1ST HOSP IP/OBS HIGH 75: CPT | Performed by: PHYSICIAN ASSISTANT

## 2025-03-18 RX ORDER — OMEPRAZOLE 40 MG/1
40 CAPSULE, DELAYED RELEASE ORAL DAILY
COMMUNITY
End: 2025-03-20 | Stop reason: HOSPADM

## 2025-03-18 RX ORDER — CHOLECALCIFEROL (VITAMIN D3) 25 MCG
1000 TABLET ORAL
COMMUNITY
Start: 2025-01-06 | End: 2025-03-20 | Stop reason: HOSPADM

## 2025-03-18 RX ORDER — ASPIRIN 81 MG/1
81 TABLET ORAL DAILY
COMMUNITY
End: 2025-03-20 | Stop reason: HOSPADM

## 2025-03-18 RX ORDER — LOSARTAN POTASSIUM 25 MG/1
25 TABLET ORAL DAILY
COMMUNITY
End: 2025-03-20 | Stop reason: HOSPADM

## 2025-03-18 RX ORDER — MORPHINE SULFATE 4 MG/ML
4 INJECTION, SOLUTION INTRAMUSCULAR; INTRAVENOUS
Status: DISCONTINUED | OUTPATIENT
Start: 2025-03-18 | End: 2025-03-20 | Stop reason: HOSPADM

## 2025-03-18 RX ORDER — ACETAMINOPHEN 325 MG/1
650 TABLET ORAL EVERY 6 HOURS PRN
Status: ON HOLD | COMMUNITY
Start: 2025-03-02 | End: 2025-03-20

## 2025-03-18 RX ORDER — LORAZEPAM 2 MG/ML
0.5 INJECTION INTRAMUSCULAR EVERY 4 HOURS PRN
Status: DISCONTINUED | OUTPATIENT
Start: 2025-03-18 | End: 2025-03-20 | Stop reason: HOSPADM

## 2025-03-18 RX ORDER — MORPHINE SULFATE 2 MG/ML
2 INJECTION, SOLUTION INTRAMUSCULAR; INTRAVENOUS
Status: DISCONTINUED | OUTPATIENT
Start: 2025-03-18 | End: 2025-03-20 | Stop reason: HOSPADM

## 2025-03-18 RX ORDER — GLYCOPYRROLATE 0.2 MG/ML
0.2 INJECTION INTRAMUSCULAR; INTRAVENOUS EVERY 4 HOURS PRN
Status: DISCONTINUED | OUTPATIENT
Start: 2025-03-18 | End: 2025-03-20 | Stop reason: HOSPADM

## 2025-03-18 RX ORDER — LEFLUNOMIDE 20 MG/1
20 TABLET ORAL DAILY
COMMUNITY
End: 2025-03-20 | Stop reason: HOSPADM

## 2025-03-18 RX ORDER — METFORMIN HYDROCHLORIDE 500 MG/1
500 TABLET ORAL DAILY
COMMUNITY
End: 2025-03-20 | Stop reason: HOSPADM

## 2025-03-18 RX ORDER — FLUTICASONE FUROATE AND VILANTEROL 100; 25 UG/1; UG/1
1 POWDER RESPIRATORY (INHALATION) DAILY
COMMUNITY
End: 2025-03-20 | Stop reason: HOSPADM

## 2025-03-18 RX ORDER — ALBUTEROL SULFATE 90 UG/1
2 INHALANT RESPIRATORY (INHALATION) EVERY 6 HOURS PRN
COMMUNITY
End: 2025-03-20 | Stop reason: HOSPADM

## 2025-03-18 RX ORDER — CARVEDILOL 12.5 MG/1
12.5 TABLET ORAL 2 TIMES DAILY
COMMUNITY
End: 2025-03-20 | Stop reason: HOSPADM

## 2025-03-18 ASSESSMENT — COLUMBIA-SUICIDE SEVERITY RATING SCALE - C-SSRS
6. HAVE YOU EVER DONE ANYTHING, STARTED TO DO ANYTHING, OR PREPARED TO DO ANYTHING TO END YOUR LIFE?: NO
2. HAVE YOU ACTUALLY HAD ANY THOUGHTS OF KILLING YOURSELF?: NO
1. IN THE PAST MONTH, HAVE YOU WISHED YOU WERE DEAD OR WISHED YOU COULD GO TO SLEEP AND NOT WAKE UP?: NO

## 2025-03-18 ASSESSMENT — LIFESTYLE VARIABLES
EVER FELT BAD OR GUILTY ABOUT YOUR DRINKING: NO
TOTAL SCORE: 0
EVER HAD A DRINK FIRST THING IN THE MORNING TO STEADY YOUR NERVES TO GET RID OF A HANGOVER: NO
HAVE PEOPLE ANNOYED YOU BY CRITICIZING YOUR DRINKING: NO
HAVE YOU EVER FELT YOU SHOULD CUT DOWN ON YOUR DRINKING: NO

## 2025-03-18 NOTE — ED PROVIDER NOTES
HPI   Chief Complaint   Patient presents with    Altered Mental Status       Patient is a 79-year-old female past medical history as below presents today for failure to thrive.  Family states patient has continued to decline.  Patient is no longer walking eating or drinking.  They are unable to care for her needs at home.  They wish for her to be comfortable and are interested in hospice care              Patient History   No past medical history on file.  No past surgical history on file.  No family history on file.  Social History     Tobacco Use    Smoking status: Not on file    Smokeless tobacco: Not on file   Substance Use Topics    Alcohol use: Not on file    Drug use: Not on file       Physical Exam   ED Triage Vitals   Temperature Heart Rate Respirations BP   03/18/25 1831 03/18/25 1831 03/18/25 1831 03/18/25 1831   36.8 °C (98.2 °F) 81 18 (!) 183/79      Pulse Ox Temp src Heart Rate Source Patient Position   03/18/25 1831 -- 03/18/25 1900 03/18/25 1900   (!) 93 %  Monitor Lying      BP Location FiO2 (%)     03/18/25 1900 --     Right arm        Physical Exam  Vitals and nursing note reviewed.   Constitutional:       Appearance: Normal appearance.   HENT:      Head: Normocephalic and atraumatic.      Nose: Nose normal.      Mouth/Throat:      Mouth: Mucous membranes are moist.   Eyes:      Conjunctiva/sclera: Conjunctivae normal.   Cardiovascular:      Rate and Rhythm: Normal rate and regular rhythm.      Pulses: Normal pulses.      Heart sounds: Normal heart sounds.   Pulmonary:      Effort: Pulmonary effort is normal.      Breath sounds: Normal breath sounds.   Abdominal:      General: Abdomen is flat.      Palpations: Abdomen is soft.      Tenderness: There is no abdominal tenderness. There is no guarding or rebound.   Musculoskeletal:         General: No deformity.   Neurological:      General: No focal deficit present.      Mental Status: She is lethargic and disoriented.   Psychiatric:         Mood and  Affect: Mood normal.         Behavior: Behavior normal.           ED Course & MDM   ED Course as of 03/18/25 2026   Tue Mar 18, 2025   2024 ECG 12 lead  EKG shows sinus rhythm rate 92 normal intervals incomplete right bundle branch [SE]      ED Course User Index  [SE] Teddy East MD         Diagnoses as of 03/18/25 2026   Severe dementia, unspecified dementia type, unspecified whether behavioral, psychotic, or mood disturbance or anxiety   Encounter for admission to hospice care                 No data recorded     [place] Coma Scale Score: 15 (03/18/25 1933 : Deborah Louie, JORGE)                           Medical Decision Making  Presents for evaluation of failure to thrive.  I suspect she is in the end stage of her dementia.  She is no longer eating drinking or getting up she will not live long without intervention.  Family is in agreement.  They wish for her to be comfortable only.  DNR comfort measures only was signed by her  and POA.  They are agreeable for hospice care which I think is appropriate at this time.  Called discussed the case with  who accepted the patient for admission    Amount and/or Complexity of Data Reviewed  Independent Historian: spouse    Risk  Decision regarding hospitalization.  Decision not to resuscitate or to de-escalate care because of poor prognosis.        Procedure  Procedures     Teddy East MD  03/18/25 1944       Teddy East MD  03/18/25 2026

## 2025-03-18 NOTE — ED TRIAGE NOTES
Pt presents to ED with c/o difficulty ambulating x a few days per . EMS states they were called to house for similar complaint a week ago. Per EMS, pt is at her baseline, which is a&ox1-2, hx of early onset dementia. Pt hypertensive during triage.

## 2025-03-19 LAB
ATRIAL RATE: 93 BPM
P AXIS: 64 DEGREES
PR INTERVAL: 112 MS
Q ONSET: 249 MS
QRS COUNT: 15 BEATS
QRS DURATION: 115 MS
QT INTERVAL: 362 MS
QTC CALCULATION(BAZETT): 448 MS
QTC FREDERICIA: 417 MS
R AXIS: 56 DEGREES
T AXIS: 3 DEGREES
T OFFSET: 430 MS
VENTRICULAR RATE: 92 BPM

## 2025-03-19 PROCEDURE — G0378 HOSPITAL OBSERVATION PER HR: HCPCS

## 2025-03-19 PROCEDURE — 99222 1ST HOSP IP/OBS MODERATE 55: CPT

## 2025-03-19 SDOH — ECONOMIC STABILITY: HOUSING INSECURITY: IN THE PAST 12 MONTHS, HOW MANY TIMES HAVE YOU MOVED WHERE YOU WERE LIVING?: 1

## 2025-03-19 SDOH — ECONOMIC STABILITY: HOUSING INSECURITY: IN THE LAST 12 MONTHS, WAS THERE A TIME WHEN YOU WERE NOT ABLE TO PAY THE MORTGAGE OR RENT ON TIME?: NO

## 2025-03-19 SDOH — SOCIAL STABILITY: SOCIAL INSECURITY
WITHIN THE LAST YEAR, HAVE YOU BEEN KICKED, HIT, SLAPPED, OR OTHERWISE PHYSICALLY HURT BY YOUR PARTNER OR EX-PARTNER?: NO

## 2025-03-19 SDOH — ECONOMIC STABILITY: FOOD INSECURITY: HOW HARD IS IT FOR YOU TO PAY FOR THE VERY BASICS LIKE FOOD, HOUSING, MEDICAL CARE, AND HEATING?: NOT VERY HARD

## 2025-03-19 SDOH — ECONOMIC STABILITY: FOOD INSECURITY: WITHIN THE PAST 12 MONTHS, THE FOOD YOU BOUGHT JUST DIDN'T LAST AND YOU DIDN'T HAVE MONEY TO GET MORE.: NEVER TRUE

## 2025-03-19 SDOH — SOCIAL STABILITY: SOCIAL INSECURITY
WITHIN THE LAST YEAR, HAVE YOU BEEN RAPED OR FORCED TO HAVE ANY KIND OF SEXUAL ACTIVITY BY YOUR PARTNER OR EX-PARTNER?: NO

## 2025-03-19 SDOH — SOCIAL STABILITY: SOCIAL INSECURITY: WITHIN THE LAST YEAR, HAVE YOU BEEN HUMILIATED OR EMOTIONALLY ABUSED IN OTHER WAYS BY YOUR PARTNER OR EX-PARTNER?: NO

## 2025-03-19 SDOH — ECONOMIC STABILITY: FOOD INSECURITY: WITHIN THE PAST 12 MONTHS, YOU WORRIED THAT YOUR FOOD WOULD RUN OUT BEFORE YOU GOT THE MONEY TO BUY MORE.: NEVER TRUE

## 2025-03-19 SDOH — SOCIAL STABILITY: SOCIAL INSECURITY: WITHIN THE LAST YEAR, HAVE YOU BEEN AFRAID OF YOUR PARTNER OR EX-PARTNER?: NO

## 2025-03-19 SDOH — ECONOMIC STABILITY: TRANSPORTATION INSECURITY: IN THE PAST 12 MONTHS, HAS LACK OF TRANSPORTATION KEPT YOU FROM MEDICAL APPOINTMENTS OR FROM GETTING MEDICATIONS?: NO

## 2025-03-19 SDOH — SOCIAL STABILITY: SOCIAL INSECURITY: HAS ANYONE EVER THREATENED TO HURT YOUR FAMILY OR YOUR PETS?: UNABLE TO ASSESS

## 2025-03-19 SDOH — SOCIAL STABILITY: SOCIAL INSECURITY: DO YOU FEEL ANYONE HAS EXPLOITED OR TAKEN ADVANTAGE OF YOU FINANCIALLY OR OF YOUR PERSONAL PROPERTY?: UNABLE TO ASSESS

## 2025-03-19 SDOH — ECONOMIC STABILITY: INCOME INSECURITY: IN THE PAST 12 MONTHS HAS THE ELECTRIC, GAS, OIL, OR WATER COMPANY THREATENED TO SHUT OFF SERVICES IN YOUR HOME?: NO

## 2025-03-19 SDOH — SOCIAL STABILITY: SOCIAL INSECURITY: WERE YOU ABLE TO COMPLETE ALL THE BEHAVIORAL HEALTH SCREENINGS?: NO

## 2025-03-19 SDOH — SOCIAL STABILITY: SOCIAL INSECURITY: HAVE YOU HAD ANY THOUGHTS OF HARMING ANYONE ELSE?: UNABLE TO ASSESS

## 2025-03-19 SDOH — SOCIAL STABILITY: SOCIAL INSECURITY: DOES ANYONE TRY TO KEEP YOU FROM HAVING/CONTACTING OTHER FRIENDS OR DOING THINGS OUTSIDE YOUR HOME?: UNABLE TO ASSESS

## 2025-03-19 SDOH — ECONOMIC STABILITY: HOUSING INSECURITY: AT ANY TIME IN THE PAST 12 MONTHS, WERE YOU HOMELESS OR LIVING IN A SHELTER (INCLUDING NOW)?: NO

## 2025-03-19 SDOH — SOCIAL STABILITY: SOCIAL INSECURITY: ARE YOU OR HAVE YOU BEEN THREATENED OR ABUSED PHYSICALLY, EMOTIONALLY, OR SEXUALLY BY ANYONE?: UNABLE TO ASSESS

## 2025-03-19 SDOH — SOCIAL STABILITY: SOCIAL INSECURITY: ABUSE: ADULT

## 2025-03-19 SDOH — SOCIAL STABILITY: SOCIAL INSECURITY: DO YOU FEEL UNSAFE GOING BACK TO THE PLACE WHERE YOU ARE LIVING?: UNABLE TO ASSESS

## 2025-03-19 SDOH — SOCIAL STABILITY: SOCIAL INSECURITY: HAVE YOU HAD THOUGHTS OF HARMING ANYONE ELSE?: NO

## 2025-03-19 SDOH — SOCIAL STABILITY: SOCIAL INSECURITY: ARE THERE ANY APPARENT SIGNS OF INJURIES/BEHAVIORS THAT COULD BE RELATED TO ABUSE/NEGLECT?: UNABLE TO ASSESS

## 2025-03-19 ASSESSMENT — ACTIVITIES OF DAILY LIVING (ADL)
JUDGMENT_ADEQUATE_SAFELY_COMPLETE_DAILY_ACTIVITIES: NO
BATHING: DEPENDENT
LACK_OF_TRANSPORTATION: NO
WALKS IN HOME: DEPENDENT
TOILETING: DEPENDENT
HEARING - RIGHT EAR: UNABLE TO ASSESS
HEARING - LEFT EAR: UNABLE TO ASSESS
ADEQUATE_TO_COMPLETE_ADL: NO
FEEDING YOURSELF: DEPENDENT
LACK_OF_TRANSPORTATION: NO
GROOMING: DEPENDENT
PATIENT'S MEMORY ADEQUATE TO SAFELY COMPLETE DAILY ACTIVITIES?: NO
DRESSING YOURSELF: DEPENDENT

## 2025-03-19 ASSESSMENT — COGNITIVE AND FUNCTIONAL STATUS - GENERAL
DRESSING REGULAR UPPER BODY CLOTHING: TOTAL
CLIMB 3 TO 5 STEPS WITH RAILING: TOTAL
EATING MEALS: TOTAL
TOILETING: TOTAL
PATIENT BASELINE BEDBOUND: YES
DRESSING REGULAR LOWER BODY CLOTHING: TOTAL
DAILY ACTIVITIY SCORE: 6
TURNING FROM BACK TO SIDE WHILE IN FLAT BAD: TOTAL
MOVING TO AND FROM BED TO CHAIR: TOTAL
PERSONAL GROOMING: TOTAL
WALKING IN HOSPITAL ROOM: TOTAL
HELP NEEDED FOR BATHING: TOTAL
STANDING UP FROM CHAIR USING ARMS: TOTAL

## 2025-03-19 ASSESSMENT — LIFESTYLE VARIABLES
SKIP TO QUESTIONS 9-10: 1
AUDIT-C TOTAL SCORE: 0
HOW MANY STANDARD DRINKS CONTAINING ALCOHOL DO YOU HAVE ON A TYPICAL DAY: PATIENT DOES NOT DRINK
AUDIT-C TOTAL SCORE: 0
HOW OFTEN DO YOU HAVE A DRINK CONTAINING ALCOHOL: NEVER
HOW OFTEN DO YOU HAVE 6 OR MORE DRINKS ON ONE OCCASION: NEVER

## 2025-03-19 ASSESSMENT — PATIENT HEALTH QUESTIONNAIRE - PHQ9
1. LITTLE INTEREST OR PLEASURE IN DOING THINGS: NOT AT ALL
SUM OF ALL RESPONSES TO PHQ9 QUESTIONS 1 & 2: 0
2. FEELING DOWN, DEPRESSED OR HOPELESS: NOT AT ALL

## 2025-03-19 ASSESSMENT — PAIN SCALES - GENERAL
PAINLEVEL_OUTOF10: 0 - NO PAIN
PAINLEVEL_OUTOF10: 0 - NO PAIN

## 2025-03-19 ASSESSMENT — PAIN - FUNCTIONAL ASSESSMENT: PAIN_FUNCTIONAL_ASSESSMENT: 0-10

## 2025-03-19 NOTE — CARE PLAN
The patient's goals for the shift include      The clinical goals for the shift include pt will be safe and comftorable      Problem: Pain - Adult  Goal: Verbalizes/displays adequate comfort level or baseline comfort level  Outcome: Progressing     Problem: Safety - Adult  Goal: Free from fall injury  Outcome: Progressing     Problem: Discharge Planning  Goal: Discharge to home or other facility with appropriate resources  Outcome: Progressing     Problem: Chronic Conditions and Co-morbidities  Goal: Patient's chronic conditions and co-morbidity symptoms are monitored and maintained or improved  Outcome: Progressing     Problem: Nutrition  Goal: Nutrient intake appropriate for maintaining nutritional needs  Outcome: Progressing     Problem: Skin  Goal: Participates in plan/prevention/treatment measures  Outcome: Progressing  Flowsheets (Taken 3/19/2025 1523)  Participates in plan/prevention/treatment measures: Elevate heels  Goal: Prevent/manage excess moisture  Outcome: Progressing  Flowsheets (Taken 3/19/2025 1523)  Prevent/manage excess moisture:   Cleanse incontinence/protect with barrier cream   Moisturize dry skin  Goal: Prevent/minimize sheer/friction injuries  Outcome: Progressing  Flowsheets (Taken 3/19/2025 1523)  Prevent/minimize sheer/friction injuries: Turn/reposition every 2 hours/use positioning/transfer devices

## 2025-03-19 NOTE — CONSULTS
Inpatient consult to Palliative Care  Consult performed by: ERIC Gant-CNP  Consult ordered by: Jeffery Nguyen PA-C          Reason For Consult  Reason for Consult: communication / medical decision making     History Of Present Illness  Coby Ferris is a 79 y.o. female with past medical history of  advanced dementia, remote CVA, hypertension, type 2 diabetes mellitus, CKD stage III, hyperlipidemia, and remote DVT,   who was admitted under the medicine service from home for failure to thrive 3/18/2025.  The patient's  and daughter reported the patient has had general weakening and decline over the last several weeks, and more so over the past several days; the patient was treated in this hospital on 3/10/2025 for failure to thrive and dehydration, at which time the patient was discharged under the care of her family to home and was tolerating oral intake at that time.  The patient's family stated the patient is no longer having oral intake and they would like to place the patient under hospice services and make her DNR CC.  The patient's CODE STATUS was changed to DNR CC and a hospice consult was ordered; comfort medications were ordered.  The patient's family specified they do not want to pursue any interventions including blood work or IV fluids.  Palliative care was consulted to discuss goals of care and advance care planning.    Upon assessment of the patient this morning, the patient was unable to participate in her assessment, but did not appear to be in any obvious distress.  Of note, the patient did not require any as needed comfort medications since admission.    ESAS ([place] Symptom Assessment System): Unable to assess due to the patient's current condition and mentation.    PPS (Palliative Prognostic Scale): Previously 40 to 50%.    PMH: as above     Personal/Social History  Patient previously lives at home with her  and daughter.  She has no history on file for tobacco use,  "alcohol use, and drug use.    Past Medical History  She has no past medical history on file.    Surgical History  She has no past surgical history on file.     Family History  No family history on file.  Allergies  Codeine    Review of Systems   Reason unable to perform ROS: Current condition and mentation.        Physical Exam  Constitutional:       General: She is not in acute distress.     Appearance: She is ill-appearing. She is not toxic-appearing or diaphoretic.      Comments: Elderly, frail, somnolent   HENT:      Head: Normocephalic and atraumatic.      Right Ear: External ear normal.      Left Ear: External ear normal.      Nose: Nose normal.      Mouth/Throat:      Mouth: Mucous membranes are moist.      Pharynx: Oropharynx is clear.   Eyes:      Pupils: Pupils are equal, round, and reactive to light.   Cardiovascular:      Rate and Rhythm: Normal rate and regular rhythm.      Pulses: Normal pulses.      Heart sounds: Normal heart sounds.   Pulmonary:      Effort: Pulmonary effort is normal.      Comments: Diminished lung sounds bilaterally  Abdominal:      General: Bowel sounds are normal.      Palpations: Abdomen is soft.   Musculoskeletal:      Cervical back: Normal range of motion.      Right lower leg: No edema.      Left lower leg: No edema.   Skin:     General: Skin is warm and dry.   Neurological:      Comments: Somnolent   Psychiatric:      Comments: Without restlessness         Last Recorded Vitals  Blood pressure (!) 187/81, pulse 83, temperature 36.8 °C (98.2 °F), resp. rate 19, height 1.499 m (4' 11\"), weight 52.2 kg (115 lb), SpO2 (!) 93%.    Relevant Results  Scheduled medications     Continuous medications     PRN medications  PRN medications: glycopyrrolate, LORazepam, morphine, morphine    Results for orders placed or performed during the hospital encounter of 03/18/25 (from the past 96 hours)   ECG 12 lead   Result Value Ref Range    Ventricular Rate 92 BPM    Atrial Rate 93 BPM    CO " Interval 112 ms    QRS Duration 115 ms    QT Interval 362 ms    QTC Calculation(Bazett) 448 ms    P Axis 64 degrees    R Axis 56 degrees    T Axis 3 degrees    QRS Count 15 beats    Q Onset 249 ms    T Offset 430 ms    QTC Fredericia 417 ms     ECG 12 lead    Result Date: 3/19/2025  Sinus rhythm Incomplete right bundle branch block    ECG 12 lead    Result Date: 3/14/2025  Sinus rhythm with short UT with Fusion complexes Otherwise normal ECG No previous ECGs available See ED provider note for full interpretation and clinical correlation Confirmed by Henry Skinner ([id number]) on 3/14/2025 3:58:41 PM    CT angio chest w and wo IV contrast    Result Date: 3/1/2025  * * *Final Report* * * DATE OF EXAM: Mar  1 2025  5:22PM   FVC   0564  -  CTA CHEST (NON GATED) W IVCON PE  / ACCESSION #  [id number] PROCEDURE REASON: Pulmonary embolism (PE) suspected, high prob      * * * * Physician Interpretation * * * *   EXAMINATION:  CHEST CTA (NON GATED) WITH CONTRAST (PULMONARY EMBOLISM PROTOCOL) CLINICAL HISTORY:  Pulmonary embolism (PE) suspected, high prob  she began having pain to her right shoulder/upper arm.  The pain is constant and moderate in severity, worsened with movement. Technique:  Spiral CT acquisition of the chest from the thoracic inlet to the upper abdomen following IV contrast.  Axial 1 and 3 mm thick slices plus coronal and sagittal reformatted images. MQ:  CTCP_5 Contrast:  100 mL Omnipaque 350 IV CT Radiation dose: Integrated Dose-length product (DLP) for this visit =   229 mGy*cm CT Dose Reduction Employed: Automated exposure control (AEC) CTA: Post-processed images  were created, reviewed and archived. Comparison:  12/13/2022 CT chest RESULT: Limitations:  None. Evaluation for thromboembolic disease:      - Right heart chambers:  No thromboembolic disease.      - Main pulmonary arteries:  No thromboembolic disease.      - Lobar pulmonary arteries:  No thromboembolic disease.      - Segmental pulmonary  arteries:  No thromboembolic disease.      - Subsegmental pulmonary arteries:  No thromboembolic disease.      - Additional pulmonary artery findings:  The main pulmonary artery is normal in caliber. Lines, tubes, and devices:  None. Lung parenchyma, airways, and pleural:  Fibrosis and associated traction bronchiectasis in both lungs, with subpleural/basilar predominance. No consolidation. Central airways are patent.    Calcified granuloma right lower lobe. Lower neck, lymph nodes, and mediastinum:    The imaged thyroid gland is normal.    No lymphadenopathy in the supraclavicular, axillary, mediastinal, or hilar regions. Heart, pericardium, and thoracic vessels:  No pericardial effusion.   Normal caliber aorta. Bones and soft tissues:  Chest wall is unremarkable. Upper abdomen:  Cholecystectomy.  (topogram) images: No additional findings.    IMPRESSION: No CT evidence of pulmonary embolus. Features of chronic interstitial fibrosis. _ _ : [facility]ARRE[facility]   Transcribe Date/Time: Mar  1 2025  6:08P Dictated by : ANTWAN KUHN MD This examination was interpreted and the report reviewed and electronically signed by: ANTWAN KUHN MD on Mar  1 2025  6:13PM  EST    Vascular US Upper Extremity Venous Duplex Right    Result Date: 3/1/2025  * * *Final Report* * * DATE OF EXAM: Mar  1 2025  1:48PM   FVU   1004  -  US DVT UPPER RT  / ACCESSION #  [id number] PROCEDURE REASON: Arm pain or tenderness      * * * * Physician Interpretation * * * *  EXAMINATION:  RIGHT UPPER EXTREMITY DEEP VENOUS ULTRASOUND WITH DOPPLER IMAGING CLINICAL HISTORY: TECHNIQUE:  Grayscale with compression maneuvers where accessible, color and spectral Doppler of the right internal jugular, subclavian, and axillary veins was performed.  Grayscale with compression maneuvers of the right brachial, basilic and cephalic veins was also performed. The contralateral internal jugular and distal subclavian veins were imaged for comparison. Images were  obtained and stored in a permanent archive. MQ:   USUER_1 COMPARISON: None RESULT: RIGHT UPPER EXTREMITY DEEP VEINS Internal Jugular vein: Normal compression, normal spontaneous flow. Subclavian vein:  Normal, spontaneous flow. Axillary vein:  Normal compression, normal spontaneous flow. Brachial vein:  Normal compression SUPERFICIAL VEINS Basilic vein: Normal compression. Cephalic vein:  Normal compression. LEFT UPPER EXTREMITY (FOR COMPARISON) DEEP VEINS Internal Jugular and Distal Subclavian veins: Normal compression, normal spontaneous flow.    IMPRESSION: Negative study for DVT in the right upper extremity. Negative study for superficial thrombophlebitis in the imaged segments of the right upper extremity. : [employer]   Transcribe Date/Time: Mar  1 2025  2:09P Dictated by : MAI YUSUF MD This examination was interpreted and the report reviewed and electronically signed by: MAI YUSUF MD on Mar  1 2025  2:10PM  EST    XR chest 1 view    Result Date: 3/1/2025  * * *Final Report* * * DATE OF EXAM: Mar  1 2025 12:13PM   FVX   5290  -  XR CHEST 1V FRONTAL   / ACCESSION #  [id number] PROCEDURE REASON: Chest Pain      * * * * Physician Interpretation * * * *  CLINICAL:..... Arthritis TECHNIQUE: AP, true AP and transscapular views of the right shoulder RESULT: There is no evidence of acute fracture, subluxation or dislocation seen. The soft tissues are grossly normal.  The glenohumeral interval is preserved.  Mild AC joint space narrowing is present.   EXAMINATION:  CHEST RADIOGRAPH (SINGLE VIEW AP OR PA) CLINICAL HISTORY: Arthritis (accession [id number]), Chest Pain (accession [id number]) MQ:  XC1_5 Comparison:  10/09/2024 RESULT: Lines, tubes, and devices:  None. Lungs and pleura:  No consolidation. No lung mass. No pleural effusion. Cardiomediastinal silhouette:  Normal cardiomediastinal silhouette. Other:  .    IMPRESSION: No evidence of an acute cardiopulmonary process. No identifiable acute  traumatic abnormality in the right shoulder. : [facility]   Transcribe Date/Time: Mar  1 2025 12:58P Dictated by : MAI YUSUF MD This examination was interpreted and the report reviewed and electronically signed by: MAI YUSUF MD on Mar  1 2025  1:00PM  EST    XR shoulder right 2+ views    Result Date: 3/1/2025  * * *Final Report* * * DATE OF EXAM: Mar  1 2025 12:13PM   FVX   5253  -  XR SHLDR >/=3V AP/BIRGIT AP/OTHR RT  / ACCESSION #  [id number] PROCEDURE REASON: Arthritis      * * * * Physician Interpretation * * * *  CLINICAL:..... Arthritis TECHNIQUE: AP, true AP and transscapular views of the right shoulder RESULT: There is no evidence of acute fracture, subluxation or dislocation seen. The soft tissues are grossly normal.  The glenohumeral interval is preserved.  Mild AC joint space narrowing is present.   EXAMINATION:  CHEST RADIOGRAPH (SINGLE VIEW AP OR PA) CLINICAL HISTORY: Arthritis (accession [id number]), Chest Pain (accession [id number]) MQ:  XC1_5 Comparison:  10/09/2024 RESULT: Lines, tubes, and devices:  None.  Lungs and pleura:  No consolidation. No lung mass. No pleural effusion. Cardiomediastinal silhouette:  Normal cardiomediastinal silhouette. Other:  .    IMPRESSION: No evidence of an acute cardiopulmonary process. No identifiable acute traumatic abnormality in the right shoulder. : [facility]   Transcribe Date/Time: Mar  1 2025 12:58P Dictated by : MAI YUSUF MD This examination was interpreted and the report reviewed and electronically signed by: MAI YUSUF MD on Mar  1 2025  1:00PM  EST        Assessment/Plan   IMP:  Coby Ferris is a 79 y.o. female with past medical history of advanced dementia, remote CVA, hypertension, type 2 diabetes mellitus, CKD stage III, hyperlipidemia, and remote DVT,  who was admitted under the medicine service from home for failure to thrive 3/18/2025.  The patient's  and daughter reported the patient has had general  weakening and decline over the last several weeks, and more so over the past several days; the patient was treated in this hospital on 3/10/2025 for failure to thrive and dehydration, at which time the patient was discharged under the care of her family to home and was tolerating oral intake at that time.  The patient's family stated the patient is no longer having oral intake and they would like to place the patient under hospice services and make her DNR CC.  The patient's CODE STATUS was changed to DNR CC and a hospice consult was ordered; comfort medications were ordered.  The patient's family specified they do not want to pursue any interventions including blood work or IV fluids.  Palliative care was consulted to discuss goals of care and advance care planning.    Recommendations  -The patient is DNR CC CODE STATUS which is quite appropriate given the patient's family's wishes to pursue total comfort measures  -Hospice consult has been ordered per the primary care team the patient is  -The patient is stable from a palliative perspective and will not require [facility] hospice admission    3/19/2025-   The patient did not appear to be in any obvious distress, and so no additional medications will be recommended per our service at this time.  The patient has not required any as needed medications to be administered since admission.  We do fully support total comfort measures and initiation of hospice care, but the patient is stable from a palliative perspective, and hospice care will need to be provided in an outpatient setting, as she is not appropriate for [facility] hospice admission at this time.  The hospital  has begun discharge planning with the patient's family, and although they had thought the patient could come in and have hospice care here, they are working with social work for potential placement to home with private duty and hospice care or potential placement for hospice services.  We do agree with  the plan for initiation of comfort measures and preserving the patient's quality of life as best possible until it is her natural time to pass.  Palliative care will continue to follow for symptom management needs as needed.    Thank you for allowing us to participate in the care of this lady.  Should you have any further questions or concerns regarding her care, please do not hesitate to contact us via Haiku/Epic Chat (Margaret Win during weekdays work hours and Michael Saavedra during weekdays after hours and over the weekend)    (This note was generated with voice recognition software and may contain errors including spelling, grammar, syntax and misrecognition of what was dictated, that are not fully corrected.)    Patient/proxy preference for information  Prefers full information    Goals of Care  The patient is DNR CC CODE STATUS.  Social work is arranging outpatient hospice care.  Total prioritization for comfort measures is appropriate.      I spent 60 minutes in the professional and overall care of this patient.      Margaret Win, APRN-CNP

## 2025-03-19 NOTE — PROGRESS NOTES
MSW made contact with the pt's dtr, Janeth [phone] in effort to explore hospice agency choices. Janeth is unaware of agencies but shares they would like to have the pt remain in [place]. MSW introduced [facility], sharing this agency is able to support either in home hospice and have a hospice inpatient facility in [place]. MSW provides the facility address and shares on the cost of inpatient. MSW verbalized that inpatient is not covered under insurance but an out of pocket cost. Janeth ask about [facility]. MSW expressed the pt is able to go to a nursing facility under long term and have hospice on but that would again be an out of pocket cost. Janeth shares her father (pt's spouse) is unable to cover this cost and unable to continue to care for the pt at home. MSW encourages Janeth Fowler expressed they brought the pt to the hospital thinking the pt would just go on hospice. MSW expressed at this time the pt isn't appropriate for [facility]IP which is covered under insurance. Pt's vitals are stable, per this MSW's engagement with pall care.     Update 3/19/2025 Janeth reporting the pt has in home care 3 days per week and is questioning if the pt would be able to keep with that. MSW shares the pt would be able to keep her in home care and have hospice on. Janeth further shares she spoke with her brother and he reports he applied for 24 hour care for the pt through [employer] insurance and was approved, but they have been denied from providers. Janeth is questioning why that is. MSW encourages the Janeth to make contact with [employer] for that answer. MSW further shared the pt is able to have private duty in home care but it's typically not a Medicare covered entity. However if she is receiving care already the family could reach out to [employer] to request an increase in hours. MSW expressed Hospice wouldn't provide 24 hour care but maybe 1-3 hours of care. Janeth shared she needed to further process with her father and  brother on this.

## 2025-03-19 NOTE — H&P
History Of Present Illness  Coby Ferris is a 79 y.o. female with past medical history significant for severe dementia, HTN, HLD, history of CVA, history of DVT, stage III CKD, and type II DM who presents to the ED today with family for failure to thrive.  Daughter and patient's  at bedside able to provide collateral information.  Family states patient has continued to decline over the past couple weeks and more significantly over the past couple days.  Patient seen here on 3/10 for failure to thrive symptoms and dehydration, and patient was ultimately discharged at that time back home under care of the family.  Patient tolerating p.o. at that point prior to discharge.  Patient is no longer walking, eating, or drinking anything.  Daughter states patient will be able to put food/drink in her mouth with assistance, however will not swallow.  Family states they are unable to fully take care of her at home and are interested in discussing hospice care at this time.  Discussed in length with family at this time and they wish for her to be comfortable, would like to change CODE STATUS to DNR comfort care, which was signed by her  and POA.  ED provider signed paperwork and will put into the system.  They are in agreement about decision to pursue hospice care at this point.  They would not like any interventions, would not like blood work drawn, or IV fluids.  They are okay with comfort care orders however.    ED course: On arrival to the ED, patient afebrile, hypertensive with blood pressure 183/79, otherwise hemodynamically stable with SpO2 93% on room air.    Admitting providers Dr. Rader     Past Medical History  No past medical history on file.    Surgical History  No past surgical history on file.     Social History  She has no history on file for tobacco use, alcohol use, and drug use.    Family History  No family history on file.     Allergies  Codeine    Review of Systems  Limited ROS secondary to  "current mental capacity    Physical Exam  Constitutional:       General: She is not in acute distress.     Comments: Limited exam secondary to patient's current mental capacity.  Patient sleeping, lying comfortably in bed.  Will turn head in response to verbal stimuli, however minimal response.   and daughter at bedside   HENT:      Head: Normocephalic and atraumatic.      Mouth/Throat:      Pharynx: Oropharynx is clear.   Eyes:      Conjunctiva/sclera: Conjunctivae normal.   Cardiovascular:      Rate and Rhythm: Normal rate and regular rhythm.      Pulses: Normal pulses.      Heart sounds: Normal heart sounds.      Comments: Hypertensive  Pulmonary:      Effort: Pulmonary effort is normal. No respiratory distress.      Breath sounds: No wheezing.      Comments: Diminished breath sounds.  Abdominal:      General: Bowel sounds are normal.      Palpations: Abdomen is soft.   Musculoskeletal:      Right lower leg: No edema.      Left lower leg: No edema.   Skin:     General: Skin is warm and dry.   Neurological:      Mental Status: She is disoriented.      Motor: Weakness present.       Last Recorded Vitals  Blood pressure 143/60, pulse 82, temperature 36.8 °C (98.2 °F), resp. rate 17, height 1.499 m (4' 11\"), weight 52.2 kg (115 lb), SpO2 (!) 91%.    Relevant Results    Assessment/Plan   Assessment & Plan  Failure to thrive in adult    Hospice care    Severe dementia      Coby Ferris is a 79 y.o. female with past medical history significant for severe dementia presents to the ED today with family for failure to thrive.  Daughter and patient's  at bedside able to provide collateral information.  Family states patient has continued to decline over the past couple weeks and more significantly over the past couple days.  Patient seen here on 3/10 for failure to thrive symptoms and dehydration, and patient was ultimately discharged at that time back home under care of the family.  Patient tolerating p.o. at " that point prior to discharge.  Patient is no longer walking, eating, or drinking anything.  Daughter states patient will be able to put food/drink in her mouth with assistance, however will not swallow.  Family states they are unable to fully take care of her at home and are interested in discussing hospice care at this time.  Discussed in length with family at this time and they wish for her to be comfortable, would like to change CODE STATUS to DNR comfort care, which was signed by her  and POA.     CODE STATUS: DNR CC, signed by ED provider     #Severe dementia  #Failure to thrive  Admit to inpatient hospice  Hospice consult/palliative care consult for end-of-life care  Comfort medications ordered  Glycopyrrolate, Ativan, pain medication as needed  Daily vital signs         I spent 60 minutes in the professional and overall care of this patient.    Jeffery Nguyen PA-C

## 2025-03-19 NOTE — H&P
History Of Present Illness  Coby Ferris is a 79 y.o. female with past medical history significant for severe dementia, HTN, HLD, history of CVA, history of DVT, stage III CKD, and type II DM who presents to the ED today with family for failure to thrive.  Daughter and patient's  at bedside able to provide collateral information.  Family states patient has continued to decline over the past couple weeks and more significantly over the past couple days.  Patient seen here on 3/10 for failure to thrive symptoms and dehydration, and patient was ultimately discharged at that time back home under care of the family.  Patient tolerating p.o. at that point prior to discharge.  Patient is no longer walking, eating, or drinking anything.  Daughter states patient will be able to put food/drink in her mouth with assistance, however will not swallow.  Family states they are unable to fully take care of her at home and are interested in discussing hospice care at this time.  Discussed in length with family at this time and they wish for her to be comfortable, would like to change CODE STATUS to DNR comfort care, which was signed by her  and POA.  ED provider signed paperwork and will put into the system.  They are in agreement about decision to pursue hospice care at this point.  They would not like any interventions, would not like blood work drawn, or IV fluids.  They are okay with comfort care orders however.    ED course: On arrival to the ED, patient afebrile, hypertensive with blood pressure 183/79, otherwise hemodynamically stable with SpO2 93% on room air.    Admitting providers Dr. Rader     Past Medical History  No past medical history on file.    Surgical History  No past surgical history on file.     Social History  She reports that she has never smoked. She has never used smokeless tobacco. No history on file for alcohol use and drug use.    Family History  No family history on file.    "  Allergies  Codeine    Review of Systems  Limited ROS secondary to current mental capacity    Physical Exam  Constitutional:       General: She is not in acute distress.     Comments: Limited exam secondary to patient's current mental capacity.  Patient sleeping, lying comfortably in bed.  Will turn head in response to verbal stimuli, however minimal response.   and daughter at bedside   HENT:      Head: Normocephalic and atraumatic.      Mouth/Throat:      Pharynx: Oropharynx is clear.   Eyes:      Conjunctiva/sclera: Conjunctivae normal.   Cardiovascular:      Rate and Rhythm: Normal rate and regular rhythm.      Pulses: Normal pulses.      Heart sounds: Normal heart sounds.      Comments: Hypertensive  Pulmonary:      Effort: Pulmonary effort is normal. No respiratory distress.      Breath sounds: No wheezing.      Comments: Diminished breath sounds.  Abdominal:      General: Bowel sounds are normal.      Palpations: Abdomen is soft.   Musculoskeletal:      Right lower leg: No edema.      Left lower leg: No edema.   Skin:     General: Skin is warm and dry.   Neurological:      Mental Status: She is disoriented.      Motor: Weakness present.       Last Recorded Vitals  Blood pressure 177/78, pulse 98, temperature 36.3 °C (97.3 °F), temperature source Temporal, resp. rate 19, height 1.499 m (4' 11.02\"), weight 52.2 kg (115 lb), SpO2 92%.    Relevant Results    Assessment/Plan   Assessment & Plan  Failure to thrive in adult    Hospice care    Severe dementia    Severe dementia, unspecified dementia type, unspecified whether behavioral, psychotic, or mood disturbance or anxiety      Coby Ferris is a 79 y.o. female with past medical history significant for severe dementia presents to the ED today with family for failure to thrive.  Daughter and patient's  at bedside able to provide collateral information.  Family states patient has continued to decline over the past couple weeks and more significantly " over the past couple days.  Patient seen here on 3/10 for failure to thrive symptoms and dehydration, and patient was ultimately discharged at that time back home under care of the family.  Patient tolerating p.o. at that point prior to discharge.  Patient is no longer walking, eating, or drinking anything.  Daughter states patient will be able to put food/drink in her mouth with assistance, however will not swallow.  Family states they are unable to fully take care of her at home and are interested in discussing hospice care at this time.  Discussed in length with family at this time and they wish for her to be comfortable, would like to change CODE STATUS to DNR comfort care, which was signed by her  and POA.     CODE STATUS: DNR CC, signed by ED provider     #Severe dementia  #Failure to thrive  Admit to inpatient hospice  Hospice consult/palliative care consult for end-of-life care  Comfort medications ordered  Glycopyrrolate, Ativan, pain medication as needed  Daily vital signs         I spent 60 minutes in the professional and overall care of this patient.    Timo Rader, DO

## 2025-03-19 NOTE — PROGRESS NOTES
Pharmacy Medication History Review    Coby Ferris is a 79 y.o. female admitted for Severe dementia, unspecified dementia type, unspecified whether behavioral, psychotic, or mood disturbance or anxiety. Pharmacy reviewed the patient's [id number] medications and allergies for accuracy.    The list below reflectives the updated PTA list. Please review each medication in order reconciliation for additional clarification and justification.  Prior to Admission medications    Medication Sig Start Date End Date Taking? Authorizing Provider   acetaminophen (Tylenol) 325 mg tablet Take 2 tablets (650 mg) by mouth every 6 hours if needed. 3/2/25  Yes Historical Provider, MD   albuterol 90 mcg/actuation inhaler Inhale 2 puffs every 6 hours if needed for wheezing or shortness of breath.   Yes Historical Provider, MD   aspirin 81 mg EC tablet Take 1 tablet (81 mg) by mouth once daily.  Patient not taking: Reported on 3/19/2025    Historical Provider, MD   carvedilol (Coreg) 12.5 mg tablet Take 1 tablet (12.5 mg) by mouth 2 times a day.  Patient not taking: Reported on 3/19/2025    Historical Provider, MD   cholecalciferol (Vitamin D-3) 25 mcg (1000 units) tablet Take 1 tablet (1,000 Units) by mouth once daily.  Patient not taking: Reported on 3/19/2025 1/6/25 4/6/25  Historical Provider, MD   famotidine (Pepcid) 20 mg tablet Take 1 tablet (20 mg) by mouth 2 times a day for 15 days.  Patient not taking: Reported on 3/19/2025 3/10/25 3/25/25  Chantal Allen, APRN-CNP   fluticasone furoate-vilanteroL (Breo Ellipta) 100-25 mcg/dose inhaler Inhale 1 puff once daily.  Patient not taking: Reported on 3/19/2025    Historical Provider, MD   leflunomide (Arava) 20 mg tablet Take 1 tablet (20 mg) by mouth once daily.  Patient not taking: Reported on 3/19/2025    Historical Provider, MD   losartan (Cozaar) 25 mg tablet Take 1 tablet (25 mg) by mouth once daily.  Patient not taking: Reported on 3/19/2025    Historical Provider,  MD   metFORMIN (Glucophage) 500 mg tablet Take 1 tablet (500 mg) by mouth once daily.  Patient not taking: Reported on 3/19/2025    Historical Provider, MD   omeprazole (PriLOSEC) 40 mg DR capsule Take 1 capsule (40 mg) by mouth once daily.  Patient not taking: Reported on 3/19/2025    Historical Provider, MD        The list below reflectives the updated allergy list. Please review each documented allergy for additional clarification and justification.  Allergies  Reviewed by Lluvia Charles RN on 3/18/2025        Severity Reactions Comments    Codeine Medium GI Upset             Below are additional concerns with the patient's PTA list.    Patient's  at bedside reports patient has started Hospice care with [facility] on 03/18/2025 and he reports the patient is no longer taking medications at home.    Karina Rascon

## 2025-03-20 VITALS
TEMPERATURE: 96.4 F | HEIGHT: 59 IN | OXYGEN SATURATION: 91 % | HEART RATE: 103 BPM | SYSTOLIC BLOOD PRESSURE: 148 MMHG | BODY MASS INDEX: 23.18 KG/M2 | DIASTOLIC BLOOD PRESSURE: 86 MMHG | WEIGHT: 115 LBS | RESPIRATION RATE: 16 BRPM

## 2025-03-20 PROCEDURE — G0378 HOSPITAL OBSERVATION PER HR: HCPCS

## 2025-03-20 PROCEDURE — 99233 SBSQ HOSP IP/OBS HIGH 50: CPT

## 2025-03-20 RX ORDER — OXYCODONE HYDROCHLORIDE 5 MG/1
5 TABLET ORAL EVERY 6 HOURS PRN
Qty: 15 TABLET | Refills: 0 | Status: SHIPPED | OUTPATIENT
Start: 2025-03-20

## 2025-03-20 RX ORDER — LORAZEPAM 0.5 MG/1
0.5 TABLET ORAL 2 TIMES DAILY PRN
Qty: 30 TABLET | Refills: 0 | Status: SHIPPED | OUTPATIENT
Start: 2025-03-20

## 2025-03-20 RX ORDER — ACETAMINOPHEN 325 MG/1
650 TABLET ORAL EVERY 6 HOURS PRN
Qty: 30 TABLET | Refills: 0 | Status: SHIPPED | OUTPATIENT
Start: 2025-03-20

## 2025-03-20 ASSESSMENT — PAIN SCALES - GENERAL: PAINLEVEL_OUTOF10: 0 - NO PAIN

## 2025-03-20 NOTE — PROGRESS NOTES
"Coby Ferris is a 79 y.o. female on day 1 of admission presenting with Severe dementia, unspecified dementia type, unspecified whether behavioral, psychotic, or mood disturbance or anxiety.    Subjective   Upon assessment of the patient this morning, the patient was somnolent and without obvious distress.  The patient's sister was bedside and stated the patient had been intermittently drowsy yesterday and had eaten a bite of applesauce for family.  She stated the patient's children are having a difficult time understanding that the patient is dying.    Objective     Physical Exam  Constitutional:       General: She is not in acute distress.     Appearance: She is ill-appearing. She is not toxic-appearing or diaphoretic.      Comments: Elderly, frail, somnolent   HENT:      Head: Normocephalic and atraumatic.      Right Ear: External ear normal.      Left Ear: External ear normal.      Nose: Nose normal.      Mouth/Throat:      Mouth: Mucous membranes are moist.      Pharynx: Oropharynx is clear.   Eyes:      Pupils: Pupils are equal, round, and reactive to light.   Cardiovascular:      Rate and Rhythm: Normal rate and regular rhythm.      Pulses: Normal pulses.      Heart sounds: Normal heart sounds.   Pulmonary:      Effort: Pulmonary effort is normal.      Comments: Diminished lung sounds bilaterally  Abdominal:      General: Bowel sounds are normal.      Palpations: Abdomen is soft.   Musculoskeletal:      Cervical back: Normal range of motion.      Right lower leg: No edema.      Left lower leg: No edema.   Skin:     General: Skin is warm and dry.   Neurological:      Comments: Somnolent   Psychiatric:      Comments: Without restlessness     Last Recorded Vitals  Blood pressure 148/86, pulse 103, temperature 35.8 °C (96.4 °F), resp. rate 14, height 1.499 m (4' 11.02\"), weight 52.2 kg (115 lb), SpO2 91%.  Intake/Output last 3 Shifts:  No intake/output data recorded.    Relevant Results  Scheduled medications   "   Continuous medications     PRN medications  PRN medications: glycopyrrolate, LORazepam, morphine, morphine      Assessment/Plan   IMP:  Coby Ferris is a 79 y.o. female with past medical history of advanced dementia, remote CVA, hypertension, type 2 diabetes mellitus, CKD stage III, hyperlipidemia, and remote DVT,  who was admitted under the medicine service from home for failure to thrive 3/18/2025.  The patient's  and daughter reported the patient has had general weakening and decline over the last several weeks, and more so over the past several days; the patient was treated in this hospital on 3/10/2025 for failure to thrive and dehydration, at which time the patient was discharged under the care of her family to home and was tolerating oral intake at that time.  The patient's family stated the patient is no longer having oral intake and they would like to place the patient under hospice services and make her DNR CC.  The patient's CODE STATUS was changed to DNR CC and a hospice consult was ordered; comfort medications were ordered.  The patient's family specified they do not want to pursue any interventions including blood work or IV fluids.  Palliative care was consulted to discuss goals of care and advance care planning.     Recommendations  -The patient is DNR CC CODE STATUS which is quite appropriate given the patient's family's wishes to pursue total comfort measures and the patient's advanced dementia  -Hospice consult has been ordered per the primary care team the patient is  -The patient is stable from a palliative perspective and will not require GIP hospice admission     3/19/2025-   The patient did not appear to be in any obvious distress, and so no additional medications will be recommended per our service at this time.  The patient has not required any as needed medications to be administered since admission.  We do fully support total comfort measures and initiation of hospice care, but  the patient is stable from a palliative perspective, and hospice care will need to be provided in an outpatient setting, as she is not appropriate for GIP hospice admission at this time.  The hospital  has begun discharge planning with the patient's family, and although they had thought the patient could come in and have hospice care here, they are working with social work for potential placement to home with private duty and hospice care or potential placement for hospice services.  We do agree with the plan for initiation of comfort measures and preserving the patient's quality of life as best possible until it is her natural time to pass.  Palliative care will continue to follow for symptom management needs as needed.    3/20/2025-   The patient did not appear to be in any obvious distress, and so no additional medications will be recommended per our service at this time.  The patient did have 2 doses morphine 2 mg, 2 doses morphine 4 mg, 2 doses of IV Ativan, and 2 doses IV glycopyrrolate since yesterday afternoon, and remained without symptoms this morning.  The patient continues to remain stable for discharge to hospice in the outpatient setting; she may be appropriate for a hospice IPU at this time.  It does appear as though family are concerned about cost associated with placing the patient for care with hospice, and the hospital  is coordinating with family for safe discharge planning that will work for them.  I did spend approximately 30 minutes with the patient's sister this morning discussing the process of dying and how the patient has presented from her last ED visit through this admission.  We discussed that there is an insidious loss of functional ability as well as loss of appetite over time especially associated with dementia, and when the body becomes so weak that it cannot function normally, there is a more profound increase of fatigue and sleeping, as we are seeing  during this hospitalization.  The patient's sister stated the patient's children are having a difficult time accepting that the patient is not eating or drinking, and we discussed at length that it is a normal process of the body to attempt to become much more dry before passing, and that artificial nutrition and fluids would only cause the patient more complications due to the body's inability to process these things well.  We discussed that IV fluids can cause not only limb edema but also fluid to build up around more vital organs such as the lungs and heart.  We also discussed that any feeding the patient may tolerate should be initiated when the patient is ready for it, and she should not be forced fed.  The patient no longer has active instinct for eating and drinking on a regular basis that is sustainable, and so she should be supported from a comfort perspective with these items only, and so that she does not become uncomfortable attempting to inhale food into her lungs.  The patient's sister verbalized understanding and agreement.  She stated the full family are meeting with a hospice agency today in order to discuss hospice care at home.  She stated they do continue to have concern that although the patient has strong family support, there will be times when the patient would be home alone with her , and the patient's  is not able to care for the patient given his own level of debility.  I did recommend that the family speak with the hospital  regarding potential placement to a hospice IPU that may be able to better accommodate a financial plan or may be able to help with discounted rates.  I answered her questions and concerns to the best of my ability and offered emotional support.  Palliative care will continue to follow.     Thank you for allowing us to participate in the care of this lady.  Should you have any further questions or concerns regarding her care, please do not  hesitate to contact us via Haiku/Epic Chat (Margaret Win during weekdays work hours and Michael Saavedra during weekdays after hours and over the weekend)     (This note was generated with voice recognition software and may contain errors including spelling, grammar, syntax and misrecognition of what was dictated, that are not fully corrected.)    Patient/proxy preference for information  Prefers full information    Goals of Care  The patient remains DNR CC CODE STATUS.  The goal is for continued focus on comfort measures and transitioning the patient to hospice care.           I spent 45 minutes in the professional and overall care of this patient.      Margaret Win, APRN-CNP

## 2025-03-20 NOTE — PROGRESS NOTES
Carola met with Vicki from [facility] and with pt's family regarding DC planning/Hospice services.  Pt's family are still deciding on if they want pt to go to a LTC facility with hospice, residential hospice with [facility] or home with Hospice.  Pt's family were interested in [facility] due to pt having limited financial resources.   Sw call Kim with [facility] phone [phone] and they have a waitlist at this time.  The family is open to having an informational with [facility] to see if they can work with the family with cost.  Carola made the referral as requested.  Carola provided Crow Guardado with the phone number of the son to set up the meeting. Sw answered further questions pt's family had. Sw will continue to provide support and services as needed.     12:30pm Carola spoke with pt's family and they have decided for pt to go home with [facility].  Carola spoke with Shahla with [facility] phone [phone] and pt's family have signed on with Hospice.  The hospice agency said the medical equipment can be delivered by 4pm.  Hospice request that we set up the stretcher transport between 4 and 5pm.  Sw requested that the unit secretary set up the transport. Sw to let hospice know the time once it is set up.  Carola notified the following MD and the DC order will be placed. Sw will continue to provide support and services as needed.     1:48pm Sw was informed that the  time is 4:30pm via stretcher transport.  Carola notified [facility] of the  time.  The family was also notified of the transport time. Carola will continue to provide support and services.

## 2025-03-20 NOTE — CARE PLAN
The patient's goals for the shift include  non verbal    The clinical goals for the shift include pt will be safe and comftorable      Problem: Skin  Goal: Prevent/manage excess moisture  Flowsheets (Taken 3/19/2025 2157)  Prevent/manage excess moisture:   Moisturize dry skin   Cleanse incontinence/protect with barrier cream  Goal: Prevent/minimize sheer/friction injuries  Flowsheets (Taken 3/19/2025 2157)  Prevent/minimize sheer/friction injuries: Use pull sheet

## 2025-03-20 NOTE — CARE PLAN
The patient's goals for the shift include      The clinical goals for the shift include pt will be safe and comftorable      Problem: Pain - Adult  Goal: Verbalizes/displays adequate comfort level or baseline comfort level  Outcome: Progressing     Problem: Safety - Adult  Goal: Free from fall injury  Outcome: Progressing     Problem: Discharge Planning  Goal: Discharge to home or other facility with appropriate resources  Outcome: Progressing     Problem: Chronic Conditions and Co-morbidities  Goal: Patient's chronic conditions and co-morbidity symptoms are monitored and maintained or improved  Outcome: Progressing     Problem: Nutrition  Goal: Nutrient intake appropriate for maintaining nutritional needs  Outcome: Progressing     Problem: Skin  Goal: Prevent/manage excess moisture  Outcome: Progressing  Flowsheets (Taken 3/20/2025 0939)  Prevent/manage excess moisture:   Cleanse incontinence/protect with barrier cream   Moisturize dry skin  Goal: Prevent/minimize sheer/friction injuries  Outcome: Progressing  Flowsheets (Taken 3/20/2025 0939)  Prevent/minimize sheer/friction injuries: Turn/reposition every 2 hours/use positioning/transfer devices

## 2025-04-17 NOTE — DISCHARGE SUMMARY
Discharge Diagnosis  Severe dementia, unspecified dementia type, unspecified whether behavioral, psychotic, or mood disturbance or anxiety    Issues Requiring Follow-Up  Dementia    Discharge Meds     Medication List      START taking these medications     LORazepam 0.5 mg tablet; Commonly known as: Ativan; Take 1 tablet (0.5   mg) by mouth 2 times a day as needed for anxiety.   oxyCODONE 5 mg immediate release tablet; Commonly known as: Roxicodone;   Take 1 tablet (5 mg) by mouth every 6 hours if needed for moderate pain (4   - 6).     CHANGE how you take these medications     acetaminophen 325 mg tablet; Commonly known as: Tylenol; Take 2 tablets   (650 mg) by mouth every 6 hours if needed for mild pain (1 - 3).; What   changed: reasons to take this     STOP taking these medications     albuterol 90 mcg/actuation inhaler   aspirin 81 mg EC tablet   Breo Ellipta 100-25 mcg/dose inhaler; Generic drug: fluticasone   furoate-vilanteroL   carvedilol 12.5 mg tablet; Commonly known as: Coreg   cholecalciferol 25 mcg (1,000 units) tablet; Commonly known as: Vitamin   D-3   famotidine 20 mg tablet; Commonly known as: Pepcid   leflunomide 20 mg tablet; Commonly known as: Arava   losartan 25 mg tablet; Commonly known as: Cozaar   metFORMIN 500 mg tablet; Commonly known as: Glucophage   omeprazole 40 mg DR capsule; Commonly known as: PriLOSEC       Test Results Pending At Discharge  Pending Labs       No current pending labs.            Hospital Course   Coby Ferris is a 79 y.o. female with past medical history significant for severe dementia presents to the ED today with family for failure to thrive.  Daughter and patient's  at bedside able to provide collateral information.  Family states patient has continued to decline over the past couple weeks and more significantly over the past couple days.  Patient seen here on 3/10 for failure to thrive symptoms and dehydration, and patient was ultimately discharged at that  time back home under care of the family.  Patient tolerating p.o. at that point prior to discharge.  Patient is no longer walking, eating, or drinking anything.  Daughter states patient will be able to put food/drink in her mouth with assistance, however will not swallow.  Family states they are unable to fully take care of her at home and are interested in discussing hospice care at this time.  Discussed in length with family at this time and they wish for her to be comfortable, would like to change CODE STATUS to DNR comfort care, which was signed by her  and POA.      CODE STATUS: DNR CC, signed by ED provider      #Severe dementia  #Failure to thrive  Admit to inpatient hospice  Hospice consult/palliative care consult for end-of-life care  Comfort medications ordered  Glycopyrrolate, Ativan, pain medication as needed  Daily vital signs     Patient discharged on hospice     Pertinent Physical Exam At Time of Discharge  Physical Exam  Constitutional:       General: She is not in acute distress.     Comments: Limited exam secondary to patient's current mental capacity.  Patient sleeping, lying comfortably in bed.  Will turn head in response to verbal stimuli, however minimal response.   and daughter at bedside   HENT:      Head: Normocephalic and atraumatic.      Mouth/Throat:      Pharynx: Oropharynx is clear.   Eyes:      Conjunctiva/sclera: Conjunctivae normal.   Cardiovascular:      Rate and Rhythm: Normal rate and regular rhythm.      Pulses: Normal pulses.      Heart sounds: Normal heart sounds.      Comments: Hypertensive  Pulmonary:      Effort: Pulmonary effort is normal. No respiratory distress.      Breath sounds: No wheezing.      Comments: Diminished breath sounds.  Abdominal:      General: Bowel sounds are normal.      Palpations: Abdomen is soft.   Musculoskeletal:      Right lower leg: No edema.      Left lower leg: No edema.   Skin:     General: Skin is warm and dry.   Neurological:       Mental Status: She is disoriented.      Motor: Weakness present.      Outpatient Follow-Up  No future appointments.      Timo Rader, DO